# Patient Record
Sex: FEMALE | Employment: OTHER | URBAN - METROPOLITAN AREA
[De-identification: names, ages, dates, MRNs, and addresses within clinical notes are randomized per-mention and may not be internally consistent; named-entity substitution may affect disease eponyms.]

---

## 2021-07-23 ENCOUNTER — APPOINTMENT (OUTPATIENT)
Dept: CT IMAGING | Age: 86
End: 2021-07-23
Payer: COMMERCIAL

## 2021-07-23 ENCOUNTER — HOSPITAL ENCOUNTER (EMERGENCY)
Age: 86
Discharge: HOME | End: 2021-07-23
Attending: EMERGENCY MEDICINE
Payer: COMMERCIAL

## 2021-07-23 ENCOUNTER — APPOINTMENT (OUTPATIENT)
Dept: GENERAL RADIOLOGY | Age: 86
End: 2021-07-23
Payer: COMMERCIAL

## 2021-07-23 VITALS
DIASTOLIC BLOOD PRESSURE: 81 MMHG | WEIGHT: 145 LBS | TEMPERATURE: 98 F | BODY MASS INDEX: 27.38 KG/M2 | RESPIRATION RATE: 16 BRPM | OXYGEN SATURATION: 95 % | HEART RATE: 90 BPM | HEIGHT: 61 IN | SYSTOLIC BLOOD PRESSURE: 163 MMHG

## 2021-07-23 DIAGNOSIS — R45.89: Primary | ICD-10-CM

## 2021-07-23 LAB
A/G RATIO: 1.2 (ref 1.1–2.2)
ACETAMINOPHEN LEVEL: <5 UG/ML (ref 10–30)
ALBUMIN SERPL-MCNC: 4 G/DL (ref 3.4–5)
ALP BLD-CCNC: 51 U/L (ref 40–129)
ALT SERPL-CCNC: 14 U/L (ref 10–40)
AMPHETAMINE SCREEN, URINE: NORMAL
ANION GAP SERPL CALCULATED.3IONS-SCNC: 10 MMOL/L (ref 3–16)
AST SERPL-CCNC: 28 U/L (ref 15–37)
BARBITURATE SCREEN URINE: NORMAL
BASOPHILS ABSOLUTE: 0 K/UL (ref 0–0.2)
BASOPHILS RELATIVE PERCENT: 0.4 %
BENZODIAZEPINE SCREEN, URINE: NORMAL
BILIRUB SERPL-MCNC: 0.9 MG/DL (ref 0–1)
BUN BLDV-MCNC: 18 MG/DL (ref 7–20)
CALCIUM SERPL-MCNC: 9.1 MG/DL (ref 8.3–10.6)
CANNABINOID SCREEN URINE: NORMAL
CHLORIDE BLD-SCNC: 106 MMOL/L (ref 99–110)
CO2: 24 MMOL/L (ref 21–32)
COCAINE METABOLITE SCREEN URINE: NORMAL
CREAT SERPL-MCNC: 0.7 MG/DL (ref 0.6–1.2)
EKG ATRIAL RATE: 89 BPM
EKG DIAGNOSIS: NORMAL
EKG P AXIS: 77 DEGREES
EKG P-R INTERVAL: 182 MS
EKG Q-T INTERVAL: 378 MS
EKG QRS DURATION: 80 MS
EKG QTC CALCULATION (BAZETT): 459 MS
EKG R AXIS: 15 DEGREES
EKG T AXIS: -22 DEGREES
EKG VENTRICULAR RATE: 89 BPM
EOSINOPHILS ABSOLUTE: 0.1 K/UL (ref 0–0.6)
EOSINOPHILS RELATIVE PERCENT: 1.1 %
ETHANOL: NORMAL MG/DL (ref 0–0.08)
GFR AFRICAN AMERICAN: >60
GFR NON-AFRICAN AMERICAN: >60
GLOBULIN: 3.4 G/DL
GLUCOSE BLD-MCNC: 113 MG/DL (ref 70–99)
HCT VFR BLD CALC: 35.2 % (ref 36–48)
HEMOGLOBIN: 12 G/DL (ref 12–16)
LYMPHOCYTES ABSOLUTE: 0.9 K/UL (ref 1–5.1)
LYMPHOCYTES RELATIVE PERCENT: 19.6 %
Lab: NORMAL
MCH RBC QN AUTO: 30.5 PG (ref 26–34)
MCHC RBC AUTO-ENTMCNC: 33.9 G/DL (ref 31–36)
MCV RBC AUTO: 89.7 FL (ref 80–100)
METHADONE SCREEN, URINE: NORMAL
MONOCYTES ABSOLUTE: 0.5 K/UL (ref 0–1.3)
MONOCYTES RELATIVE PERCENT: 11 %
NEUTROPHILS ABSOLUTE: 3.1 K/UL (ref 1.7–7.7)
NEUTROPHILS RELATIVE PERCENT: 67.9 %
OPIATE SCREEN URINE: NORMAL
OXYCODONE URINE: NORMAL
PDW BLD-RTO: 13.1 % (ref 12.4–15.4)
PH UA: 5
PHENCYCLIDINE SCREEN URINE: NORMAL
PLATELET # BLD: 243 K/UL (ref 135–450)
PMV BLD AUTO: 7.9 FL (ref 5–10.5)
POTASSIUM SERPL-SCNC: 3.7 MMOL/L (ref 3.5–5.1)
PRO-BNP: 362 PG/ML (ref 0–449)
PROPOXYPHENE SCREEN: NORMAL
RBC # BLD: 3.92 M/UL (ref 4–5.2)
SALICYLATE, SERUM: <0.3 MG/DL (ref 15–30)
SARS-COV-2, NAAT: NOT DETECTED
SODIUM BLD-SCNC: 140 MMOL/L (ref 136–145)
TOTAL PROTEIN: 7.4 G/DL (ref 6.4–8.2)
WBC # BLD: 4.6 K/UL (ref 4–11)

## 2021-07-23 PROCEDURE — 85025 COMPLETE CBC W/AUTO DIFF WBC: CPT

## 2021-07-23 PROCEDURE — 70450 CT HEAD/BRAIN W/O DYE: CPT

## 2021-07-23 PROCEDURE — 93005 ELECTROCARDIOGRAM TRACING: CPT | Performed by: EMERGENCY MEDICINE

## 2021-07-23 PROCEDURE — 71045 X-RAY EXAM CHEST 1 VIEW: CPT

## 2021-07-23 PROCEDURE — 87635 SARS-COV-2 COVID-19 AMP PRB: CPT

## 2021-07-23 PROCEDURE — 80143 DRUG ASSAY ACETAMINOPHEN: CPT

## 2021-07-23 PROCEDURE — 82077 ASSAY SPEC XCP UR&BREATH IA: CPT

## 2021-07-23 PROCEDURE — 93010 ELECTROCARDIOGRAM REPORT: CPT | Performed by: INTERNAL MEDICINE

## 2021-07-23 PROCEDURE — 80179 DRUG ASSAY SALICYLATE: CPT

## 2021-07-23 PROCEDURE — 80053 COMPREHEN METABOLIC PANEL: CPT

## 2021-07-23 PROCEDURE — 99284 EMERGENCY DEPT VISIT MOD MDM: CPT

## 2021-07-23 PROCEDURE — 83880 ASSAY OF NATRIURETIC PEPTIDE: CPT

## 2021-07-23 PROCEDURE — 80307 DRUG TEST PRSMV CHEM ANLYZR: CPT

## 2021-07-23 ASSESSMENT — ENCOUNTER SYMPTOMS
VOMITING: 0
DIARRHEA: 0
CHEST TIGHTNESS: 0
EYE PAIN: 0
ABDOMINAL PAIN: 0
SORE THROAT: 0
SHORTNESS OF BREATH: 0

## 2021-07-23 NOTE — ED NOTES
Pt states she does not want her daughter called to be notified of pt being discharged.         Stacey RICK
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
## 2021-07-23 NOTE — ED NOTES
Level of Care Disposition: Discharge  Patient was seen by ED provider and [facility] ER staff. The case was presented to psychiatric provider on-call Dr. Nyla Stevens.   Based on the ED evaluation and information presented to the provider by Jose A Rehman RN , it is the recommendation of the on call psychiatric provider that the patient be discharged from a psychiatric standpoint with the following referrals: crisis line, resources              [facility]  07/23/21 3483
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
## 2021-07-23 NOTE — ED NOTES
Assisted to ambulate to bathroom. Gait steady. No noted distress.       Irving Jacques RN  07/23/21 6723
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
## 2024-01-29 ENCOUNTER — APPOINTMENT (OUTPATIENT)
Dept: CT IMAGING | Age: 89
DRG: 057 | End: 2024-01-29
Payer: MEDICARE

## 2024-01-29 ENCOUNTER — APPOINTMENT (OUTPATIENT)
Dept: GENERAL RADIOLOGY | Age: 89
DRG: 057 | End: 2024-01-29
Payer: MEDICARE

## 2024-01-29 ENCOUNTER — HOSPITAL ENCOUNTER (INPATIENT)
Age: 89
LOS: 15 days | Discharge: HOME | DRG: 057 | End: 2024-02-13
Attending: STUDENT IN AN ORGANIZED HEALTH CARE EDUCATION/TRAINING PROGRAM | Admitting: PSYCHIATRY & NEUROLOGY
Payer: MEDICARE

## 2024-01-29 DIAGNOSIS — F22 PARANOIA (HCC): Primary | ICD-10-CM

## 2024-01-29 PROBLEM — F03.918 DEMENTIA WITH BEHAVIORAL DISTURBANCE (HCC): Status: ACTIVE | Noted: 2024-01-29

## 2024-01-29 LAB
ALBUMIN SERPL-MCNC: 4.2 G/DL (ref 3.4–5)
ALBUMIN/GLOB SERPL: 1.8 {RATIO} (ref 1.1–2.2)
ALP SERPL-CCNC: 44 U/L (ref 40–129)
ALT SERPL-CCNC: 7 U/L (ref 10–40)
AMPHETAMINES UR QL SCN>1000 NG/ML: NORMAL
ANION GAP SERPL CALCULATED.3IONS-SCNC: 8 MMOL/L (ref 3–16)
APAP SERPL-MCNC: <5 UG/ML (ref 10–30)
AST SERPL-CCNC: 16 U/L (ref 15–37)
BARBITURATES UR QL SCN>200 NG/ML: NORMAL
BASOPHILS # BLD: 0 K/UL (ref 0–0.2)
BASOPHILS NFR BLD: 0 %
BENZODIAZ UR QL SCN>200 NG/ML: NORMAL
BILIRUB SERPL-MCNC: 0.8 MG/DL (ref 0–1)
BILIRUB UR QL STRIP.AUTO: NEGATIVE
BUN SERPL-MCNC: 21 MG/DL (ref 7–20)
CALCIUM SERPL-MCNC: 9.1 MG/DL (ref 8.3–10.6)
CANNABINOIDS UR QL SCN>50 NG/ML: NORMAL
CHLORIDE SERPL-SCNC: 105 MMOL/L (ref 99–110)
CLARITY UR: CLEAR
CO2 SERPL-SCNC: 27 MMOL/L (ref 21–32)
COCAINE UR QL SCN: NORMAL
COLOR UR: YELLOW
CREAT SERPL-MCNC: 0.7 MG/DL (ref 0.6–1.2)
DEPRECATED RDW RBC AUTO: 13.6 % (ref 12.4–15.4)
DRUG SCREEN COMMENT UR-IMP: NORMAL
EKG ATRIAL RATE: 59 BPM
EKG DIAGNOSIS: NORMAL
EKG P AXIS: 66 DEGREES
EKG P-R INTERVAL: 164 MS
EKG Q-T INTERVAL: 412 MS
EKG QRS DURATION: 78 MS
EKG QTC CALCULATION (BAZETT): 407 MS
EKG R AXIS: -5 DEGREES
EKG T AXIS: -3 DEGREES
EKG VENTRICULAR RATE: 59 BPM
EOSINOPHIL # BLD: 0 K/UL (ref 0–0.6)
EOSINOPHIL NFR BLD: 0 %
ETHANOLAMINE SERPL-MCNC: NORMAL MG/DL (ref 0–0.08)
FENTANYL SCREEN, URINE: NORMAL
FLUAV RNA RESP QL NAA+PROBE: NOT DETECTED
FLUBV RNA RESP QL NAA+PROBE: NOT DETECTED
GFR SERPLBLD CREATININE-BSD FMLA CKD-EPI: >60 ML/MIN/{1.73_M2}
GLUCOSE SERPL-MCNC: 139 MG/DL (ref 70–99)
HCT VFR BLD AUTO: 35 % (ref 36–48)
HGB BLD-MCNC: 11.9 G/DL (ref 12–16)
HGB UR QL STRIP.AUTO: NEGATIVE
KETONES UR STRIP.AUTO-MCNC: NEGATIVE MG/DL
LEUKOCYTE ESTERASE UR QL STRIP.AUTO: NEGATIVE
LYMPHOCYTES # BLD: 1.3 K/UL (ref 1–5.1)
LYMPHOCYTES NFR BLD: 23 %
MCH RBC QN AUTO: 31 PG (ref 26–34)
MCHC RBC AUTO-ENTMCNC: 34.1 G/DL (ref 31–36)
MCV RBC AUTO: 90.9 FL (ref 80–100)
METHADONE UR QL SCN>300 NG/ML: NORMAL
MONOCYTES # BLD: 0.6 K/UL (ref 0–1.3)
MONOCYTES NFR BLD: 11 %
NEUTROPHILS # BLD: 3.8 K/UL (ref 1.7–7.7)
NEUTROPHILS NFR BLD: 66 %
NITRITE UR QL STRIP.AUTO: NEGATIVE
OPIATES UR QL SCN>300 NG/ML: NORMAL
OXYCODONE UR QL SCN: NORMAL
PCP UR QL SCN>25 NG/ML: NORMAL
PH UR STRIP.AUTO: 6 [PH] (ref 5–8)
PH UR STRIP: 6 [PH]
PLATELET # BLD AUTO: 212 K/UL (ref 135–450)
PLATELET BLD QL SMEAR: ADEQUATE
PMV BLD AUTO: 8.9 FL (ref 5–10.5)
POTASSIUM SERPL-SCNC: 3.8 MMOL/L (ref 3.5–5.1)
PROT SERPL-MCNC: 6.5 G/DL (ref 6.4–8.2)
PROT UR STRIP.AUTO-MCNC: NEGATIVE MG/DL
RBC # BLD AUTO: 3.85 M/UL (ref 4–5.2)
RBC MORPH BLD: NORMAL
SALICYLATES SERPL-MCNC: <0.3 MG/DL (ref 15–30)
SARS-COV-2 RNA RESP QL NAA+PROBE: NOT DETECTED
SLIDE REVIEW: ABNORMAL
SODIUM SERPL-SCNC: 140 MMOL/L (ref 136–145)
SP GR UR STRIP.AUTO: 1.01 (ref 1–1.03)
TROPONIN, HIGH SENSITIVITY: 20 NG/L (ref 0–14)
TROPONIN, HIGH SENSITIVITY: 23 NG/L (ref 0–14)
TSH SERPL DL<=0.005 MIU/L-ACNC: 1.72 UIU/ML (ref 0.27–4.2)
UA DIPSTICK W REFLEX MICRO PNL UR: NORMAL
URN SPEC COLLECT METH UR: NORMAL
UROBILINOGEN UR STRIP-ACNC: 0.2 E.U./DL
WBC # BLD AUTO: 5.7 K/UL (ref 4–11)

## 2024-01-29 PROCEDURE — 82077 ASSAY SPEC XCP UR&BREATH IA: CPT

## 2024-01-29 PROCEDURE — 84484 ASSAY OF TROPONIN QUANT: CPT

## 2024-01-29 PROCEDURE — 81003 URINALYSIS AUTO W/O SCOPE: CPT

## 2024-01-29 PROCEDURE — 80053 COMPREHEN METABOLIC PANEL: CPT

## 2024-01-29 PROCEDURE — 87636 SARSCOV2 & INF A&B AMP PRB: CPT

## 2024-01-29 PROCEDURE — 80179 DRUG ASSAY SALICYLATE: CPT

## 2024-01-29 PROCEDURE — 80061 LIPID PANEL: CPT

## 2024-01-29 PROCEDURE — 93010 ELECTROCARDIOGRAM REPORT: CPT | Performed by: INTERNAL MEDICINE

## 2024-01-29 PROCEDURE — 83036 HEMOGLOBIN GLYCOSYLATED A1C: CPT

## 2024-01-29 PROCEDURE — 93005 ELECTROCARDIOGRAM TRACING: CPT | Performed by: STUDENT IN AN ORGANIZED HEALTH CARE EDUCATION/TRAINING PROGRAM

## 2024-01-29 PROCEDURE — 84443 ASSAY THYROID STIM HORMONE: CPT

## 2024-01-29 PROCEDURE — 80307 DRUG TEST PRSMV CHEM ANLYZR: CPT

## 2024-01-29 PROCEDURE — 36415 COLL VENOUS BLD VENIPUNCTURE: CPT

## 2024-01-29 PROCEDURE — 80143 DRUG ASSAY ACETAMINOPHEN: CPT

## 2024-01-29 PROCEDURE — 85025 COMPLETE CBC W/AUTO DIFF WBC: CPT

## 2024-01-29 PROCEDURE — 70450 CT HEAD/BRAIN W/O DYE: CPT

## 2024-01-29 PROCEDURE — 99285 EMERGENCY DEPT VISIT HI MDM: CPT

## 2024-01-29 PROCEDURE — 71045 X-RAY EXAM CHEST 1 VIEW: CPT

## 2024-01-29 PROCEDURE — 1240000000 HC EMOTIONAL WELLNESS R&B

## 2024-01-29 PROCEDURE — 93005 ELECTROCARDIOGRAM TRACING: CPT | Performed by: PSYCHIATRY & NEUROLOGY

## 2024-01-29 RX ORDER — IBUPROFEN 400 MG/1
400 TABLET ORAL EVERY 6 HOURS PRN
Status: DISCONTINUED | OUTPATIENT
Start: 2024-01-29 | End: 2024-01-30

## 2024-01-29 RX ORDER — OLANZAPINE 5 MG/1
2.5 TABLET ORAL EVERY 8 HOURS PRN
Status: DISCONTINUED | OUTPATIENT
Start: 2024-01-29 | End: 2024-02-13 | Stop reason: HOSPADM

## 2024-01-29 RX ORDER — HYDROXYZINE HYDROCHLORIDE 10 MG/1
10 TABLET, FILM COATED ORAL EVERY 6 HOURS PRN
Status: DISCONTINUED | OUTPATIENT
Start: 2024-01-29 | End: 2024-02-13 | Stop reason: HOSPADM

## 2024-01-29 RX ORDER — MECOBALAMIN 5000 MCG
5 TABLET,DISINTEGRATING ORAL NIGHTLY PRN
Status: DISCONTINUED | OUTPATIENT
Start: 2024-01-29 | End: 2024-01-30

## 2024-01-29 RX ORDER — ACETAMINOPHEN 325 MG/1
650 TABLET ORAL EVERY 4 HOURS PRN
Status: DISCONTINUED | OUTPATIENT
Start: 2024-01-29 | End: 2024-01-30

## 2024-01-29 RX ORDER — BENZTROPINE MESYLATE 1 MG/ML
2 INJECTION INTRAMUSCULAR; INTRAVENOUS 2 TIMES DAILY PRN
Status: DISCONTINUED | OUTPATIENT
Start: 2024-01-29 | End: 2024-01-30

## 2024-01-29 RX ORDER — POLYETHYLENE GLYCOL 3350 17 G
2 POWDER IN PACKET (EA) ORAL
Status: DISCONTINUED | OUTPATIENT
Start: 2024-01-29 | End: 2024-02-13 | Stop reason: HOSPADM

## 2024-01-29 RX ORDER — MECOBALAMIN 5000 MCG
5 TABLET,DISINTEGRATING ORAL NIGHTLY
Status: DISCONTINUED | OUTPATIENT
Start: 2024-01-29 | End: 2024-01-29

## 2024-01-29 RX ORDER — MAGNESIUM HYDROXIDE/ALUMINUM HYDROXICE/SIMETHICONE 120; 1200; 1200 MG/30ML; MG/30ML; MG/30ML
30 SUSPENSION ORAL EVERY 6 HOURS PRN
Status: DISCONTINUED | OUTPATIENT
Start: 2024-01-29 | End: 2024-02-13 | Stop reason: HOSPADM

## 2024-01-29 RX ORDER — NICOTINE 21 MG/24HR
1 PATCH, TRANSDERMAL 24 HOURS TRANSDERMAL DAILY
Status: DISCONTINUED | OUTPATIENT
Start: 2024-01-29 | End: 2024-01-30
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
## 2024-01-29 NOTE — ED NOTES
Level of Care Disposition: admit     Patient was seen by ED provider and Nursing/SW staff.  The case presented to psychiatric provider on-call .  Based on the ED evaluation and information presented to the provider by this writer it is the recommendation of the on call psychiatric provider that inpatient hospitalization is the least restrictive environment for the patient at this time.  The patient will be admitted to the inpatient unit.               Insurance Pre certification Authorization: IZABELLA Mcmillan MSW,LSW
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
## 2024-01-30 PROBLEM — R03.0 ELEVATED BP WITHOUT DIAGNOSIS OF HYPERTENSION: Status: ACTIVE | Noted: 2024-01-30

## 2024-01-30 PROBLEM — F22 PARANOIA (HCC): Status: ACTIVE | Noted: 2024-01-30

## 2024-01-30 PROBLEM — R79.89 ELEVATED TROPONIN: Status: ACTIVE | Noted: 2024-01-30

## 2024-01-30 LAB
CHOLEST SERPL-MCNC: 247 MG/DL (ref 0–199)
EKG ATRIAL RATE: 68 BPM
EKG DIAGNOSIS: NORMAL
EKG P AXIS: 48 DEGREES
EKG P-R INTERVAL: 158 MS
EKG Q-T INTERVAL: 404 MS
EKG QRS DURATION: 78 MS
EKG QTC CALCULATION (BAZETT): 429 MS
EKG R AXIS: 24 DEGREES
EKG T AXIS: 17 DEGREES
EKG VENTRICULAR RATE: 68 BPM
HDLC SERPL-MCNC: 80 MG/DL (ref 40–60)
LDLC SERPL CALC-MCNC: 151 MG/DL
TRIGL SERPL-MCNC: 78 MG/DL (ref 0–150)
VLDLC SERPL CALC-MCNC: 16 MG/DL

## 2024-01-30 PROCEDURE — 6370000000 HC RX 637 (ALT 250 FOR IP): Performed by: PSYCHIATRY & NEUROLOGY

## 2024-01-30 PROCEDURE — 99221 1ST HOSP IP/OBS SF/LOW 40: CPT | Performed by: NURSE PRACTITIONER

## 2024-01-30 PROCEDURE — 1240000000 HC EMOTIONAL WELLNESS R&B

## 2024-01-30 PROCEDURE — 93010 ELECTROCARDIOGRAM REPORT: CPT | Performed by: INTERNAL MEDICINE

## 2024-01-30 RX ORDER — MECOBALAMIN 5000 MCG
5 TABLET,DISINTEGRATING ORAL EVERY EVENING
Status: DISCONTINUED | OUTPATIENT
Start: 2024-01-30 | End: 2024-02-13 | Stop reason: HOSPADM

## 2024-01-30 RX ORDER — ACETAMINOPHEN 325 MG/1
650 TABLET ORAL EVERY 8 HOURS PRN
Status: DISCONTINUED | OUTPATIENT
Start: 2024-01-30 | End: 2024-02-13 | Stop reason: HOSPADM

## 2024-01-30 RX ORDER — DONEPEZIL HYDROCHLORIDE 5 MG/1
5 TABLET, FILM COATED ORAL DAILY
Status: DISCONTINUED | OUTPATIENT
Start: 2024-01-31 | End: 2024-02-01

## 2024-01-30 RX ADMIN — Medication 5 MG: at 18:19

## 2024-01-30 NOTE — PLAN OF CARE
Problem: Safety - Adult  Goal: Free from fall injury  1/30/2024 1215 by Kacie Garcia RN  Outcome: Progressing     Problem: Behavior  Goal: Pt/Family maintain appropriate behavior and adhere to behavioral management agreement, if implemented  Description: INTERVENTIONS:  1. Assess patient/family's coping skills and  non-compliant behavior (including use of illegal substances)  2. Notify security of behavior or suspected illegal substances which indicate the need for search of the family and/or belongings  3. Encourage verbalization of thoughts and concerns in a socially appropriate manner  4. Utilize positive, consistent limit setting strategies supporting safety of patient, staff and others  5. Encourage participation in the decision making process about the behavioral management agreement  6. If a visitor's behavior poses a threat to safety call refer to organization policy.  7. Initiate consult with , Psychosocial CNS, Spiritual Care as appropriate  Outcome: Progressing    Pt has been visible on the unit. She is pleasantly confused. She has no scheduled medications. Pt does not smoke at home- nicotine patch not given. Pt has eaten breakfast and lunch. She has attended groups and is currently watching tv. Denies needs
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
## 2024-01-30 NOTE — GROUP NOTE
Group Therapy Note    Date: 1/30/2024    Group Start Time: 1000  Group End Time: 1045  Group Topic: Cognitive Skills    [facility]    Ziyad Franks        Group Therapy Note    Topic: Sensory Reminiscence    Group members discussed reminiscence and legacy-building in theory. Facilitator led group discussion of 5 senses, reminisced on memories from childhood, teen years, young adulthood within 5 senses.    Attendees: 4       Notes:  Sonia present and actively engaged during discussions. Reminisced on sensory memories associated with childhood including meals, preferred clothes, leisure activities, first car, first job. Met goal for group.    Status After Intervention:  Improved    Participation Level: Active Listener and Interactive    Participation Quality: Appropriate, Attentive, and Sharing      Speech:  normal      Thought Process/Content: Logical      Affective Functioning: Congruent      Mood: euthymic      Level of consciousness:  Alert and Oriented x4      Response to Learning: Capable of insight and Progressing to goal      Endings: None Reported    Modes of Intervention: Education, Support, Socialization, Exploration, Clarifying, and Problem-solving      Discipline Responsible: Psychoeducational Specialist      Signature:  Ziyad Franks, PREETI, MT-BC

## 2024-01-30 NOTE — PLAN OF CARE
[facility]  Initial Interdisciplinary Treatment Plan NOTE    Review Date & Time: 1/30/24 0900    Patient was not in treatment team    Admission Type:   Admission Type: Voluntary    Reason for admission:  Reason for Admission: Combative behavior at home, psychosis with delusional thoughts.      Estimated Length of Stay Update:  3-5 days  Estimated Discharge Date Update: 2/2/24-2/4/24    EDUCATION:   Learner Progress Toward Treatment Goals: Reviewed results and recommendations of this team    Method: Individual    Outcome: Verbalized understanding    PATIENT GOALS: none expressed    PLAN/TREATMENT RECOMMENDATIONS UPDATE:evaluate and treat    GOALS UPDATE:   Time frame for Short-Term Goals: 2 days    Abhijeet Noriega RN

## 2024-01-30 NOTE — CARE COORDINATION
Psychosocial, PTSD screen, and CSSR - S completed via chart review and collateral by VIOLET Samuels due to AMS.    Collateral obtained from daughter, Juan A.    In late 70s, psychologist diagnosed patient with a mental health disorder (bipolar or schizophrenia, cannot recall which) but she denied treatment. Has refused to go to doctors since that incident. Patient hasn't seen a doctor in over 40 years. No former neurocognitive testing or diagnosis of dementia.    Daughter suspects mom has dementia. When asked about symptoms, Juan A shared how patient displays severe memory loss and confusion. Patient frequently loses belongings, such as purse, keys, hearing aids, ect. At one point, she was climbing in and out of back window to get into house because she lost all keys and garage opener.   Patient forgets to eat on a regular basis and has stopped taking care of personal hygiene. Juan A reports mom sometimes wears the same clothes for a week straight and resists encouragement to shower and change.     Sometimes, patient thinks people who passed away several years ago are still alive and she forgets to pay her bills. Recently, Juan A found an unpaid electric bill of $1000. Juan A also reports that patient used to be extremely tech savvy (ordering groceries online, using social media, and playing games) but can no longer recall how to use a cell phone or computer.      Additionally, patient has started wandering out of the house and has increased paranoia. Has been stuffing knifes in between sofa cushions or walking dog with a knife in hand. She doesn't let people in her house due to mistrust of others and is resistant to people offering support or care.    Daughter is willing to get guardianship. This writer recommended speaking with an  regarding the guardianship process and informed patient on where to obtain guardianship paper work. [facility]
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
## 2024-01-31 LAB
EST. AVERAGE GLUCOSE BLD GHB EST-MCNC: 99.7 MG/DL
HBA1C MFR BLD: 5.1 %

## 2024-01-31 PROCEDURE — 1240000000 HC EMOTIONAL WELLNESS R&B

## 2024-01-31 PROCEDURE — 82746 ASSAY OF FOLIC ACID SERUM: CPT

## 2024-01-31 PROCEDURE — 6370000000 HC RX 637 (ALT 250 FOR IP): Performed by: PSYCHIATRY & NEUROLOGY

## 2024-01-31 PROCEDURE — 82607 VITAMIN B-12: CPT

## 2024-01-31 PROCEDURE — 99233 SBSQ HOSP IP/OBS HIGH 50: CPT | Performed by: PSYCHIATRY & NEUROLOGY

## 2024-01-31 PROCEDURE — 36415 COLL VENOUS BLD VENIPUNCTURE: CPT

## 2024-01-31 RX ADMIN — DONEPEZIL HYDROCHLORIDE 5 MG: 5 TABLET, FILM COATED ORAL at 12:35

## 2024-01-31 NOTE — GROUP NOTE
Group Therapy Note    Date: 1/31/2024    Group Start Time: 1100  Group End Time: 1145  Group Topic: Psychoeducation    [facility] MSW        Group Therapy Note    Attendees: 4    Patients discussed the values and strengths that they possess and explored their own identities. Patients were given a blank Coat of Arms and were prompted to think of words, symbols, and colors that best represent who they are, their likes, passions, talents, and skills. Patients were also asked to reflect on any important people in their lives and include images or words to represent them.        Notes:  Patient was engaged in discussion and identified colors and words that would best describe her.    Status After Intervention:  Improved    Participation Level: Active Listener    Participation Quality: Appropriate, Attentive, and Sharing      Speech:  normal      Thought Process/Content: Logical      Affective Functioning: Congruent      Mood: euthymic      Level of consciousness:  Alert and Attentive      Response to Learning: Able to verbalize current knowledge/experience, Capable of insight, Able to change behavior, and Progressing to goal      Endings: None Reported    Modes of Intervention: Education and Activity      Discipline Responsible: /Counselor      Signature:  MERLINE Samuels

## 2024-01-31 NOTE — PLAN OF CARE
Problem: Safety - Adult  Goal: Free from fall injury  Outcome: Progressing     Problem: ABCDS Injury Assessment  Goal: Absence of physical injury  Outcome: Progressing     Problem: Behavior  Goal: Pt/Family maintain appropriate behavior and adhere to behavioral management agreement, if implemented  Description: INTERVENTIONS:  1. Assess patient/family's coping skills and  non-compliant behavior (including use of illegal substances)  2. Notify security of behavior or suspected illegal substances which indicate the need for search of the family and/or belongings  3. Encourage verbalization of thoughts and concerns in a socially appropriate manner  4. Utilize positive, consistent limit setting strategies supporting safety of patient, staff and others  5. Encourage participation in the decision making process about the behavioral management agreement  6. If a visitor's behavior poses a threat to safety call refer to organization policy.  7. Initiate consult with , Psychosocial CNS, Spiritual Care as appropriate  Outcome: Progressing     Problem: Self Care Deficit  Goal: Return ADL status to a safe level of function  Description: INTERVENTIONS:  1. Administer medication as ordered  2. Assess ADL deficits and provide assistive devices as needed  3. Obtain PT/OT consults as needed  4. Assist and instruct patient to increase activity and self care as tolerated  Outcome: Progressing

## 2024-01-31 NOTE — GROUP NOTE
Group Therapy Note    Date: 1/31/2024    Group Start Time: 1000  Group End Time: 1045  Group Topic: Cognitive Skills    [facility]    Ziyad Franks        Group Therapy Note    Group members engaged in semi-structured socialization group, utilizing \"Positive Self-Talk\" conversation ball. Group members selected a question + 1 peer in group to share the question with.    Attendees: 4       Notes:  Pt present and actively engaged throughout duration of structured group stimuli. Functioned as ring-leader during peer engagement, encouraging active participation throughout. Met goal for group.    Status After Intervention:  Improved    Participation Level: Active Listener and Interactive    Participation Quality: Appropriate and Attentive      Speech:  normal      Thought Process/Content: Logical      Affective Functioning: Congruent      Mood: euthymic      Level of consciousness:  Alert and Oriented x4      Response to Learning: Able to verbalize current knowledge/experience, Able to verbalize/acknowledge new learning, and Progressing to goal      Endings: None Reported    Modes of Intervention: Support, Socialization, Activity, Media, and Reality-testing      Discipline Responsible: Psychoeducational Specialist      Signature:  Ziyad Franks, PREETI, MT-BC

## 2024-02-01 LAB
FOLATE SERPL-MCNC: 5.74 NG/ML (ref 4.78–24.2)
VIT B12 SERPL-MCNC: 182 PG/ML (ref 211–911)

## 2024-02-01 PROCEDURE — 6370000000 HC RX 637 (ALT 250 FOR IP): Performed by: PSYCHIATRY & NEUROLOGY

## 2024-02-01 PROCEDURE — 1240000000 HC EMOTIONAL WELLNESS R&B

## 2024-02-01 PROCEDURE — 99233 SBSQ HOSP IP/OBS HIGH 50: CPT | Performed by: PSYCHIATRY & NEUROLOGY

## 2024-02-01 RX ORDER — DONEPEZIL HYDROCHLORIDE 5 MG/1
10 TABLET, FILM COATED ORAL DAILY
Status: DISCONTINUED | OUTPATIENT
Start: 2024-02-01 | End: 2024-02-13 | Stop reason: HOSPADM

## 2024-02-01 RX ADMIN — Medication 5 MG: at 21:02

## 2024-02-01 RX ADMIN — DONEPEZIL HYDROCHLORIDE 10 MG: 5 TABLET, FILM COATED ORAL at 10:24

## 2024-02-01 NOTE — PLAN OF CARE
Problem: Safety - Adult  Goal: Free from fall injury  Outcome: Not Progressing     Problem: ABCDS Injury Assessment  Goal: Absence of physical injury  Outcome: Progressing     Problem: Behavior  Goal: Pt/Family maintain appropriate behavior and adhere to behavioral management agreement, if implemented  Description: INTERVENTIONS:  1. Assess patient/family's coping skills and  non-compliant behavior (including use of illegal substances)  2. Notify security of behavior or suspected illegal substances which indicate the need for search of the family and/or belongings  3. Encourage verbalization of thoughts and concerns in a socially appropriate manner  4. Utilize positive, consistent limit setting strategies supporting safety of patient, staff and others  5. Encourage participation in the decision making process about the behavioral management agreement  6. If a visitor's behavior poses a threat to safety call refer to organization policy.  7. Initiate consult with , Psychosocial CNS, Spiritual Care as appropriate  Outcome: Progressing     Problem: Safety - Adult  Goal: Free from fall injury  Outcome: Not Progressing     Problem: Self Care Deficit  Goal: Return ADL status to a safe level of function  Description: INTERVENTIONS:  1. Administer medication as ordered  2. Assess ADL deficits and provide assistive devices as needed  3. Obtain PT/OT consults as needed  4. Assist and instruct patient to increase activity and self care as tolerated  Outcome: Not Progressing

## 2024-02-01 NOTE — PLAN OF CARE
+meds. Isolated to room until after dinner. Friendly and cooperative.  Ambulates self.  Neighbor called and patient allowed me to give her information.  She said she will try to visit tomorrow.       Problem: Safety - Adult  Goal: Free from fall injury  Outcome: Progressing     Problem: ABCDS Injury Assessment  Goal: Absence of physical injury  Outcome: Progressing     Problem: Behavior  Goal: Pt/Family maintain appropriate behavior and adhere to behavioral management agreement, if implemented  Description: INTERVENTIONS:  1. Assess patient/family's coping skills and  non-compliant behavior (including use of illegal substances)  2. Notify security of behavior or suspected illegal substances which indicate the need for search of the family and/or belongings  3. Encourage verbalization of thoughts and concerns in a socially appropriate manner  4. Utilize positive, consistent limit setting strategies supporting safety of patient, staff and others  5. Encourage participation in the decision making process about the behavioral management agreement  6. If a visitor's behavior poses a threat to safety call refer to organization policy.  7. Initiate consult with , Psychosocial CNS, Spiritual Care as appropriate  Outcome: Progressing     Problem: Self Care Deficit  Goal: Return ADL status to a safe level of function  Description: INTERVENTIONS:  1. Administer medication as ordered  2. Assess ADL deficits and provide assistive devices as needed  3. Obtain PT/OT consults as needed  4. Assist and instruct patient to increase activity and self care as tolerated  Outcome: Progressing

## 2024-02-02 PROBLEM — E53.8 LOW SERUM VITAMIN B12: Status: ACTIVE | Noted: 2024-02-02

## 2024-02-02 PROCEDURE — 6370000000 HC RX 637 (ALT 250 FOR IP): Performed by: PSYCHIATRY & NEUROLOGY

## 2024-02-02 PROCEDURE — 99233 SBSQ HOSP IP/OBS HIGH 50: CPT | Performed by: PSYCHIATRY & NEUROLOGY

## 2024-02-02 PROCEDURE — 1240000000 HC EMOTIONAL WELLNESS R&B

## 2024-02-02 RX ORDER — CHOLECALCIFEROL (VITAMIN D3) 125 MCG
1000 CAPSULE ORAL DAILY
Status: DISCONTINUED | OUTPATIENT
Start: 2024-02-02 | End: 2024-02-13 | Stop reason: HOSPADM

## 2024-02-02 RX ADMIN — Medication 1000 MCG: at 15:16

## 2024-02-02 RX ADMIN — DONEPEZIL HYDROCHLORIDE 10 MG: 5 TABLET, FILM COATED ORAL at 09:41

## 2024-02-02 RX ADMIN — Medication 5 MG: at 18:24

## 2024-02-02 NOTE — GROUP NOTE
Group Therapy Note    Date: 2/2/2024    Group Start Time: 1300  Group End Time: 1400  Group Topic: Recreational    [facility]    Karime Anderson, RT        Group Therapy Note    Attendees: 5    Facilitator ran recreational therapy session using Bingo as group intervention.  Game allowed participants to exercise cognitive and social abilities.      Notes:  Pt was present and engaged across session.  Was able to look at the numbers called when she was unable to hear.  Able to find all numbers and was successful in getting the first \"cover all\" card.  Fully participated in game and demonstrated enjoyment with peers through banter, smiles, and laughter.  Met goal for group.    Status After Intervention:  Improved    Participation Level: Active Listener and Interactive    Participation Quality: Appropriate, Attentive, Sharing, and Supportive      Speech:  soft      Thought Process/Content: Logical      Affective Functioning: Congruent      Mood: euthymic      Level of consciousness:  Alert and Attentive      Response to Learning: Able to verbalize current knowledge/experience, Able to verbalize/acknowledge new learning, Able to retain information, Capable of insight, and Progressing to goal      Endings: None Reported    Modes of Intervention: Support, Socialization, and Activity      Discipline Responsible: Psychoeducational Specialist and Recreational Therapist      Signature:  Karime Anderson MA, CTRS

## 2024-02-02 NOTE — GROUP NOTE
Group Therapy Note    Date: 2/2/2024    Group Start Time: 1000  Group End Time: 1045  Group Topic: Group Therapy    [facility]    Karime Anderson, RT        Group Therapy Note    Attendees: 4    Therapist facilitated group therapy session in the form of a coffee social.  Participants were invited to the group room following community meeting to join into leisure group.  Group members were oriented to the various leisure options while on the unit and were prompted to share about their personal leisure interests during group discussion.      Notes:  Pt was present and attentive across session.  Communicated to facilitator about having trouble hearing.  Pleasant interactions with peers and positive demeanor.    Status After Intervention:  Unchanged    Participation Level: Interactive    Participation Quality: Attentive and Supportive      Speech:  normal      Thought Process/Content: Logical      Affective Functioning: Congruent      Mood: euthymic      Level of consciousness:  Alert and Attentive      Response to Learning: Able to verbalize current knowledge/experience, Capable of insight, and Progressing to goal      Endings: None Reported    Modes of Intervention: Education, Support, Socialization, and Media      Discipline Responsible: Psychoeducational Specialist and Recreational Therapist      Signature:  Karime Anderson MA, CTRS

## 2024-02-02 NOTE — PLAN OF CARE
A+Ox1.  +meds. Sang in morning group. Hard of hearing in one ear.  Visible on unit. Friendly with peers.  Appears confused, but I think she isn't interacting because of the hearing loss.  Appetite good. Did not participate in afternoon group.  Daughter called for update and said she was surprised she took her meds.     Problem: Safety - Adult  Goal: Free from fall injury  Outcome: Progressing     Problem: ABCDS Injury Assessment  Goal: Absence of physical injury  Outcome: Progressing     Problem: Behavior  Goal: Pt/Family maintain appropriate behavior and adhere to behavioral management agreement, if implemented  Description: INTERVENTIONS:  1. Assess patient/family's coping skills and  non-compliant behavior (including use of illegal substances)  2. Notify security of behavior or suspected illegal substances which indicate the need for search of the family and/or belongings  3. Encourage verbalization of thoughts and concerns in a socially appropriate manner  4. Utilize positive, consistent limit setting strategies supporting safety of patient, staff and others  5. Encourage participation in the decision making process about the behavioral management agreement  6. If a visitor's behavior poses a threat to safety call refer to organization policy.  7. Initiate consult with , Psychosocial CNS, Spiritual Care as appropriate  Outcome: Progressing

## 2024-02-03 PROCEDURE — 6370000000 HC RX 637 (ALT 250 FOR IP): Performed by: PSYCHIATRY & NEUROLOGY

## 2024-02-03 PROCEDURE — 1240000000 HC EMOTIONAL WELLNESS R&B

## 2024-02-03 PROCEDURE — 99233 SBSQ HOSP IP/OBS HIGH 50: CPT | Performed by: PSYCHIATRY & NEUROLOGY

## 2024-02-03 RX ORDER — MEMANTINE HYDROCHLORIDE 5 MG/1
5 TABLET ORAL 2 TIMES DAILY
Status: DISCONTINUED | OUTPATIENT
Start: 2024-02-03 | End: 2024-02-13 | Stop reason: HOSPADM

## 2024-02-03 RX ADMIN — Medication 1000 MCG: at 08:58

## 2024-02-03 RX ADMIN — DONEPEZIL HYDROCHLORIDE 10 MG: 5 TABLET, FILM COATED ORAL at 08:58

## 2024-02-03 RX ADMIN — Medication 5 MG: at 19:44

## 2024-02-03 RX ADMIN — MEMANTINE HYDROCHLORIDE 5 MG: 5 TABLET ORAL at 20:43

## 2024-02-03 NOTE — PLAN OF CARE
Problem: Safety - Adult  Goal: Free from fall injury  2/2/2024 2125 by Mikayla Hugo LPN  Outcome: Progressing     Problem: ABCDS Injury Assessment  Goal: Absence of physical injury  2/2/2024 2125 by Mikayla Hugo LPN  Outcome: Progressing     Problem: Behavior  Goal: Pt/Family maintain appropriate behavior and adhere to behavioral management agreement, if implemented  Description: INTERVENTIONS:  1. Assess patient/family's coping skills and  non-compliant behavior (including use of illegal substances)  2. Notify security of behavior or suspected illegal substances which indicate the need for search of the family and/or belongings  3. Encourage verbalization of thoughts and concerns in a socially appropriate manner  4. Utilize positive, consistent limit setting strategies supporting safety of patient, staff and others  5. Encourage participation in the decision making process about the behavioral management agreement  6. If a visitor's behavior poses a threat to safety call refer to organization policy.  7. Initiate consult with , Psychosocial CNS, Spiritual Care as appropriate  2/2/2024 2125 by Mikayla Hugo LPN  Outcome: Progressing     Problem: Self Care Deficit  Goal: Return ADL status to a safe level of function  Description: INTERVENTIONS:  1. Administer medication as ordered  2. Assess ADL deficits and provide assistive devices as needed  3. Obtain PT/OT consults as needed  4. Assist and instruct patient to increase activity and self care as tolerated  Outcome: Progressing

## 2024-02-03 NOTE — PLAN OF CARE
Problem: Safety - Adult  Goal: Free from fall injury  2/3/2024 0927 by Kacie Garcia, RN  Outcome: Progressing     Problem: Behavior  Goal: Pt/Family maintain appropriate behavior and adhere to behavioral management agreement, if implemented  Description: INTERVENTIONS:  1. Assess patient/family's coping skills and  non-compliant behavior (including use of illegal substances)  2. Notify security of behavior or suspected illegal substances which indicate the need for search of the family and/or belongings  3. Encourage verbalization of thoughts and concerns in a socially appropriate manner  4. Utilize positive, consistent limit setting strategies supporting safety of patient, staff and others  5. Encourage participation in the decision making process about the behavioral management agreement  6. If a visitor's behavior poses a threat to safety call refer to organization policy.  7. Initiate consult with , Psychosocial CNS, Spiritual Care as appropriate  2/3/2024 0927 by Kacie Garcia, RN  Outcome: Progressing    Pt has been visible on the unit. She is oriented to self only. She has been medication compliant. She ate breakfast in the day room and has sat amongst peers. Pt is [place] but did respond well when staff wrote stuff down for her to read. She denies SI/HI. Denies needs currently

## 2024-02-04 PROCEDURE — 6370000000 HC RX 637 (ALT 250 FOR IP): Performed by: PSYCHIATRY & NEUROLOGY

## 2024-02-04 PROCEDURE — 1240000000 HC EMOTIONAL WELLNESS R&B

## 2024-02-04 RX ADMIN — MEMANTINE HYDROCHLORIDE 5 MG: 5 TABLET ORAL at 08:39

## 2024-02-04 RX ADMIN — MEMANTINE HYDROCHLORIDE 5 MG: 5 TABLET ORAL at 21:13

## 2024-02-04 RX ADMIN — Medication 5 MG: at 18:27

## 2024-02-04 RX ADMIN — Medication 1000 MCG: at 08:39

## 2024-02-04 RX ADMIN — DONEPEZIL HYDROCHLORIDE 10 MG: 5 TABLET, FILM COATED ORAL at 08:39

## 2024-02-04 NOTE — PLAN OF CARE
Problem: Safety - Adult  Goal: Free from fall injury  2/4/2024 0923 by Kacie Garcia, RN  Outcome: Progressing     Problem: Behavior  Goal: Pt/Family maintain appropriate behavior and adhere to behavioral management agreement, if implemented  Description: INTERVENTIONS:  1. Assess patient/family's coping skills and  non-compliant behavior (including use of illegal substances)  2. Notify security of behavior or suspected illegal substances which indicate the need for search of the family and/or belongings  3. Encourage verbalization of thoughts and concerns in a socially appropriate manner  4. Utilize positive, consistent limit setting strategies supporting safety of patient, staff and others  5. Encourage participation in the decision making process about the behavioral management agreement  6. If a visitor's behavior poses a threat to safety call refer to organization policy.  7. Initiate consult with , Psychosocial CNS, Spiritual Care as appropriate  2/4/2024 0923 by Kacie Garcia, RN  Outcome: Progressing    Pt has been visible on the unit. She has been cooperative with staff. She has been medication compliant. She denies SI/HI/AVH and no RTIS noted. She ate breakfast in the day room. Pt is very [place] and responds well when phrases are written down. Denies needs currently

## 2024-02-04 NOTE — PLAN OF CARE
2/4/24 @ 0102  Pt has been calm and cooperative this shift. She takes her 2100  meds whole and without issue. Pt remains pleasantly confused. She is alert to person and place only. When this nurse would administer meds, pt would ask this nurse the reason for each one. This nurse educated pt on each med she would take and pt would become frustrated when the word \"dementia\" was mentioned. Pt denies having any memory or confusion issues. Pt would repeat the same question only minutes apart and deny having already asked that same question. Pt has limited insight into her dx. Pt denies current SI/HI and also denies AVH. Not RTIS. Pt visible in the dining room and was interacting with peers appropriately. This nurse will continue to monitor.       Problem: Safety - Adult  Goal: Free from fall injury  Outcome: Progressing     Problem: ABCDS Injury Assessment  Goal: Absence of physical injury  Outcome: Progressing     Problem: Behavior  Goal: Pt/Family maintain appropriate behavior and adhere to behavioral management agreement, if implemented  Description: INTERVENTIONS:  1. Assess patient/family's coping skills and  non-compliant behavior (including use of illegal substances)  2. Notify security of behavior or suspected illegal substances which indicate the need for search of the family and/or belongings  3. Encourage verbalization of thoughts and concerns in a socially appropriate manner  4. Utilize positive, consistent limit setting strategies supporting safety of patient, staff and others  5. Encourage participation in the decision making process about the behavioral management agreement  6. If a visitor's behavior poses a threat to safety call refer to organization policy.  7. Initiate consult with , Psychosocial CNS, Spiritual Care as appropriate  Outcome: Progressing     Problem: Self Care Deficit  Goal: Return ADL status to a safe level of function  Description: INTERVENTIONS:  1. Administer medication

## 2024-02-05 PROCEDURE — 6370000000 HC RX 637 (ALT 250 FOR IP): Performed by: PSYCHIATRY & NEUROLOGY

## 2024-02-05 PROCEDURE — 99233 SBSQ HOSP IP/OBS HIGH 50: CPT | Performed by: PSYCHIATRY & NEUROLOGY

## 2024-02-05 PROCEDURE — 1240000000 HC EMOTIONAL WELLNESS R&B

## 2024-02-05 RX ADMIN — MEMANTINE HYDROCHLORIDE 5 MG: 5 TABLET ORAL at 12:05

## 2024-02-05 RX ADMIN — Medication 5 MG: at 19:14

## 2024-02-05 RX ADMIN — DONEPEZIL HYDROCHLORIDE 10 MG: 5 TABLET, FILM COATED ORAL at 12:05

## 2024-02-05 RX ADMIN — Medication 1000 MCG: at 12:05

## 2024-02-05 RX ADMIN — MEMANTINE HYDROCHLORIDE 5 MG: 5 TABLET ORAL at 21:16

## 2024-02-05 NOTE — PLAN OF CARE
2/5/24 @ 0108  Pt calm and cooperative this shift. She does take her 2100 med whole and without issue. She is watched closely for cheeking the pill and it doesn't appear she had done that. Pt denies SI/HI and AVH and she is not noted to be RTIS. Pt denies pain currently. She is alert but only oriented to self during this nurses assessment. She remains continent and able to ambulate well with no assistive devices. Pt visible on the unit and noted to be socializing with peers. No behavioral issues this shift. Will cont to monitor.       Problem: Safety - Adult  Goal: Free from fall injury  Outcome: Progressing     Problem: ABCDS Injury Assessment  Goal: Absence of physical injury  Outcome: Progressing     Problem: Behavior  Goal: Pt/Family maintain appropriate behavior and adhere to behavioral management agreement, if implemented  Description: INTERVENTIONS:  1. Assess patient/family's coping skills and  non-compliant behavior (including use of illegal substances)  2. Notify security of behavior or suspected illegal substances which indicate the need for search of the family and/or belongings  3. Encourage verbalization of thoughts and concerns in a socially appropriate manner  4. Utilize positive, consistent limit setting strategies supporting safety of patient, staff and others  5. Encourage participation in the decision making process about the behavioral management agreement  6. If a visitor's behavior poses a threat to safety call refer to organization policy.  7. Initiate consult with , Psychosocial CNS, Spiritual Care as appropriate  Outcome: Progressing     Problem: Self Care Deficit  Goal: Return ADL status to a safe level of function  Description: INTERVENTIONS:  1. Administer medication as ordered  2. Assess ADL deficits and provide assistive devices as needed  3. Obtain PT/OT consults as needed  4. Assist and instruct patient to increase activity and self care as tolerated  Outcome:

## 2024-02-05 NOTE — GROUP NOTE
Group Therapy Note    Date: 2/5/2024    Group Start Time: 1100  Group End Time: 1145  Group Topic: Healthy Living/Wellness    [facility]    Marisela Cordova LPC        Group Therapy Note    Attendees: 7      Participants engaged in discussion about wellness outside of the hospital. Participants were able to identify ways to \"stay healthy\" and coping skills.            Notes:  Pt was an active listener throughout the group. Pt reported she was having trouble hearing and did not share insight.     Status After Intervention:  Unchanged    Participation Level: Active Listener    Participation Quality: Appropriate      Speech:  normal      Thought Process/Content: LEONIDES- pt did not share, pt reported having trouble hearing      Affective Functioning: Congruent      Mood: euthymic      Level of consciousness:  Alert      Response to Learning: LEONIDES, did not share, pt reported that she was having trouble hearing      Endings: None Reported    Modes of Intervention: Socialization and Exploration      Discipline Responsible: /Counselor      Signature:  Marisela Cordova LPC

## 2024-02-06 PROCEDURE — 6370000000 HC RX 637 (ALT 250 FOR IP): Performed by: PSYCHIATRY & NEUROLOGY

## 2024-02-06 PROCEDURE — 1240000000 HC EMOTIONAL WELLNESS R&B

## 2024-02-06 PROCEDURE — 99233 SBSQ HOSP IP/OBS HIGH 50: CPT | Performed by: PSYCHIATRY & NEUROLOGY

## 2024-02-06 RX ADMIN — MEMANTINE HYDROCHLORIDE 5 MG: 5 TABLET ORAL at 10:13

## 2024-02-06 RX ADMIN — Medication 1000 MCG: at 10:13

## 2024-02-06 RX ADMIN — Medication 5 MG: at 17:43

## 2024-02-06 RX ADMIN — DONEPEZIL HYDROCHLORIDE 10 MG: 5 TABLET, FILM COATED ORAL at 10:13

## 2024-02-06 RX ADMIN — MEMANTINE HYDROCHLORIDE 5 MG: 5 TABLET ORAL at 20:50

## 2024-02-06 NOTE — PLAN OF CARE
A+Ox3. +meds taken whole with water. Wrote down meds on paper for patient to read because she is hard of hearing. Visible on unit. Friendly with peers. Calm and cooperative. Ambulates independently. Good appetite.       Problem: Safety - Adult  Goal: Free from fall injury  2/6/2024 1018 by Michelle Albrecht RN  Outcome: Progressing  2/6/2024 0042 by Mikayla Hugo LPN  Outcome: Progressing     Problem: ABCDS Injury Assessment  Goal: Absence of physical injury  2/6/2024 1018 by Michelle Albrecht RN  Outcome: Progressing  2/6/2024 0042 by Mikayla Hugo LPN  Outcome: Progressing     Problem: Behavior  Goal: Pt/Family maintain appropriate behavior and adhere to behavioral management agreement, if implemented  Description: INTERVENTIONS:  1. Assess patient/family's coping skills and  non-compliant behavior (including use of illegal substances)  2. Notify security of behavior or suspected illegal substances which indicate the need for search of the family and/or belongings  3. Encourage verbalization of thoughts and concerns in a socially appropriate manner  4. Utilize positive, consistent limit setting strategies supporting safety of patient, staff and others  5. Encourage participation in the decision making process about the behavioral management agreement  6. If a visitor's behavior poses a threat to safety call refer to organization policy.  7. Initiate consult with , Psychosocial CNS, Spiritual Care as appropriate  2/6/2024 1018 by Michelle Albrecht RN  Outcome: Progressing  2/6/2024 0042 by Mikayla Hugo LPN  Outcome: Progressing     Problem: Self Care Deficit  Goal: Return ADL status to a safe level of function  Description: INTERVENTIONS:  1. Administer medication as ordered  2. Assess ADL deficits and provide assistive devices as needed  3. Obtain PT/OT consults as needed  4. Assist and instruct patient to increase activity and self care as tolerated  2/6/2024 1018 by Michelle Albrecht

## 2024-02-06 NOTE — GROUP NOTE
Group Therapy Note    Date: 2/6/2024    Group Start Time: 1100  Group End Time: 1145  Group Topic: Music Therapy    [facility]    Ziyad Franks        Group Therapy Note    Group facilitator provided live music stimuli in line with pt preferences. Reminiscence and reflective discussion semi-structured throughout.    Attendees: 6       Notes:  Sonia was present across session, disorganized verbal content throughout. Required consistent repeating of verbal content during discussions and reminiscence due to hearing impairment. Humorous and pleasant throughout.     Discipline Responsible: Psychoeducational Specialist      Signature:  Ziyad Franks, PREETI, MT-BC

## 2024-02-06 NOTE — PLAN OF CARE
Problem: Safety - Adult  Goal: Free from fall injury  Outcome: Progressing     Problem: ABCDS Injury Assessment  Goal: Absence of physical injury  Outcome: Progressing     Problem: Behavior  Goal: Pt/Family maintain appropriate behavior and adhere to behavioral management agreement, if implemented  Description: INTERVENTIONS:  1. Assess patient/family's coping skills and  non-compliant behavior (including use of illegal substances)  2. Notify security of behavior or suspected illegal substances which indicate the need for search of the family and/or belongings  3. Encourage verbalization of thoughts and concerns in a socially appropriate manner  4. Utilize positive, consistent limit setting strategies supporting safety of patient, staff and others  5. Encourage participation in the decision making process about the behavioral management agreement  6. If a visitor's behavior poses a threat to safety call refer to organization policy.  7. Initiate consult with , Psychosocial CNS, Spiritual Care as appropriate  Outcome: Progressing     Problem: Self Care Deficit  Goal: Return ADL status to a safe level of function  Description: INTERVENTIONS:  1. Administer medication as ordered  2. Assess ADL deficits and provide assistive devices as needed  3. Obtain PT/OT consults as needed  4. Assist and instruct patient to increase activity and self care as tolerated  Outcome: Progressing     Problem: Nutrition Deficit:  Goal: Optimize nutritional status  Outcome: Progressing

## 2024-02-07 PROCEDURE — 99233 SBSQ HOSP IP/OBS HIGH 50: CPT | Performed by: PSYCHIATRY & NEUROLOGY

## 2024-02-07 PROCEDURE — 6370000000 HC RX 637 (ALT 250 FOR IP): Performed by: PSYCHIATRY & NEUROLOGY

## 2024-02-07 PROCEDURE — 1240000000 HC EMOTIONAL WELLNESS R&B

## 2024-02-07 RX ADMIN — MEMANTINE HYDROCHLORIDE 5 MG: 5 TABLET ORAL at 08:30

## 2024-02-07 RX ADMIN — MEMANTINE HYDROCHLORIDE 5 MG: 5 TABLET ORAL at 20:08

## 2024-02-07 RX ADMIN — Medication 1000 MCG: at 08:30

## 2024-02-07 RX ADMIN — Medication 5 MG: at 20:08

## 2024-02-07 RX ADMIN — DONEPEZIL HYDROCHLORIDE 10 MG: 5 TABLET, FILM COATED ORAL at 08:30

## 2024-02-07 NOTE — PLAN OF CARE
Pt is alert and oriented to self and hospital. Pleasantly confused. Compliant with care and medications, no aggressive behaviors noted, good appetite, social with staff and peers. H.O.H. rested well last night.

## 2024-02-07 NOTE — GROUP NOTE
Group Therapy Note    Date: 2/7/2024    Group Start Time: 1000  Group End Time: 1045  Group Topic: Healthy Living/Wellness    [facility] [place] [facility] Ziyad Gomez        Group Therapy Note    Group facilitator led discussion of morning routines, preferred activities of self-care in the morning. Discussed resources for self-care, hygiene, nourishment and hydration.    Attendees: 6       Notes: Sonia reported feeling \"good enough\" and resting well overnight. Across session she was humorous while making funny faces to peers. Humorous across session when attempting to build a house of cards. Pleasant and engaging.      Discipline Responsible: Psychoeducational Specialist      Signature:  Ziyad Franks, MM, MT-BC

## 2024-02-07 NOTE — PLAN OF CARE
Problem: Safety - Adult  Goal: Free from fall injury  2/6/2024 2322 by Mikayla Hugo LPN  Outcome: Progressing     Problem: Self Care Deficit  Goal: Return ADL status to a safe level of function  Description: INTERVENTIONS:  1. Administer medication as ordered  2. Assess ADL deficits and provide assistive devices as needed  3. Obtain PT/OT consults as needed  4. Assist and instruct patient to increase activity and self care as tolerated  2/6/2024 2322 by Mikayla Hugo LPN  Outcome: Progressing     Problem: Nutrition Deficit:  Goal: Optimize nutritional status  2/6/2024 2322 by Mikayla Hugo LPN  Outcome: Progressing

## 2024-02-08 PROCEDURE — 99233 SBSQ HOSP IP/OBS HIGH 50: CPT | Performed by: PSYCHIATRY & NEUROLOGY

## 2024-02-08 PROCEDURE — 1240000000 HC EMOTIONAL WELLNESS R&B

## 2024-02-08 PROCEDURE — 6370000000 HC RX 637 (ALT 250 FOR IP): Performed by: PSYCHIATRY & NEUROLOGY

## 2024-02-08 RX ADMIN — MEMANTINE HYDROCHLORIDE 5 MG: 5 TABLET ORAL at 20:07

## 2024-02-08 RX ADMIN — MEMANTINE HYDROCHLORIDE 5 MG: 5 TABLET ORAL at 10:19

## 2024-02-08 RX ADMIN — Medication 1000 MCG: at 10:19

## 2024-02-08 RX ADMIN — Medication 5 MG: at 17:24

## 2024-02-08 RX ADMIN — DONEPEZIL HYDROCHLORIDE 10 MG: 5 TABLET, FILM COATED ORAL at 10:19

## 2024-02-08 NOTE — PLAN OF CARE
Pt is alert and oriented x 2, diminished hearing. Good appetite, received medications. No aggressive behaviors. Move about independently with steady gait. Attending groups. Social with staff and peers. No hallucinations or delusions noted.

## 2024-02-08 NOTE — GROUP NOTE
Group Therapy Note    Date: 2/8/2024    Group Start Time: 1100  Group End Time: 1145  Group Topic: Music Therapy    [facility]    Ziyad Franks        Group Therapy Note      COLLABORATIVE MUSIC MAKING    Group members were provided opportunity to play instruments while playing music in line with individual preferences.     Attendees: 3     Notes: Present and attentive to music-makers throughout session. Did not actively engage in live music-making.      Discipline Responsible: Psychoeducational Specialist      Signature:  Ziyad Franks, PREETI, MT-BC

## 2024-02-09 PROBLEM — R79.89 ELEVATED TROPONIN: Status: RESOLVED | Noted: 2024-01-30 | Resolved: 2024-02-09

## 2024-02-09 PROCEDURE — 1240000000 HC EMOTIONAL WELLNESS R&B

## 2024-02-09 PROCEDURE — 6370000000 HC RX 637 (ALT 250 FOR IP): Performed by: PSYCHIATRY & NEUROLOGY

## 2024-02-09 PROCEDURE — 99233 SBSQ HOSP IP/OBS HIGH 50: CPT | Performed by: PSYCHIATRY & NEUROLOGY

## 2024-02-09 RX ADMIN — Medication 5 MG: at 17:59

## 2024-02-09 RX ADMIN — MEMANTINE HYDROCHLORIDE 5 MG: 5 TABLET ORAL at 20:08

## 2024-02-09 RX ADMIN — DONEPEZIL HYDROCHLORIDE 10 MG: 5 TABLET, FILM COATED ORAL at 08:18

## 2024-02-09 RX ADMIN — MEMANTINE HYDROCHLORIDE 5 MG: 5 TABLET ORAL at 08:18

## 2024-02-09 RX ADMIN — Medication 1000 MCG: at 08:18

## 2024-02-09 NOTE — PLAN OF CARE
Pt is free from fall or injury.  She ambulates independently with a steady gait.  Problem: ABCDS Injury Assessment  Goal: Absence of physical injury  2/8/2024 2009 by Bere Mo, RN  Outcome: Progressing  2/8/2024 1058 by Abhijeet Noriega, RN  Outcome: Progressing

## 2024-02-09 NOTE — PLAN OF CARE
Problem: Safety - Adult  Goal: Free from fall injury  Outcome: Progressing     Problem: Self Care Deficit  Goal: Return ADL status to a safe level of function  Description: INTERVENTIONS:  1. Administer medication as ordered  2. Assess ADL deficits and provide assistive devices as needed  3. Obtain PT/OT consults as needed  4. Assist and instruct patient to increase activity and self care as tolerated  Outcome: Progressing    Pt has been visible on the unit. She has been medication compliant. She is oriented to self only. She has eaten breakfast in the day room amongst peers. She is independent with ambulation. Pt reports she slept well and she denies pain. Denies needs currently

## 2024-02-10 PROCEDURE — 1240000000 HC EMOTIONAL WELLNESS R&B

## 2024-02-10 PROCEDURE — 6370000000 HC RX 637 (ALT 250 FOR IP): Performed by: PSYCHIATRY & NEUROLOGY

## 2024-02-10 PROCEDURE — 99232 SBSQ HOSP IP/OBS MODERATE 35: CPT

## 2024-02-10 RX ADMIN — MEMANTINE HYDROCHLORIDE 5 MG: 5 TABLET ORAL at 10:37

## 2024-02-10 RX ADMIN — MEMANTINE HYDROCHLORIDE 5 MG: 5 TABLET ORAL at 20:23

## 2024-02-10 RX ADMIN — DONEPEZIL HYDROCHLORIDE 10 MG: 5 TABLET, FILM COATED ORAL at 10:37

## 2024-02-10 RX ADMIN — Medication 1000 MCG: at 10:37

## 2024-02-10 NOTE — PLAN OF CARE
Pt free from falls and injury.  She ambulates independently with a steady gait.    Problem: Safety - Adult  Goal: Free from fall injury  2/9/2024 2022 by Bere Mo, RN  Outcome: Progressing  2/9/2024 0833 by Kacie Garcia, RN  Outcome: Progressing

## 2024-02-11 PROCEDURE — 6370000000 HC RX 637 (ALT 250 FOR IP): Performed by: PSYCHIATRY & NEUROLOGY

## 2024-02-11 PROCEDURE — 1240000000 HC EMOTIONAL WELLNESS R&B

## 2024-02-11 RX ADMIN — MEMANTINE HYDROCHLORIDE 5 MG: 5 TABLET ORAL at 08:57

## 2024-02-11 RX ADMIN — Medication 1000 MCG: at 08:57

## 2024-02-11 RX ADMIN — MEMANTINE HYDROCHLORIDE 5 MG: 5 TABLET ORAL at 20:56

## 2024-02-11 RX ADMIN — DONEPEZIL HYDROCHLORIDE 10 MG: 5 TABLET, FILM COATED ORAL at 08:57

## 2024-02-12 PROCEDURE — 99233 SBSQ HOSP IP/OBS HIGH 50: CPT | Performed by: PSYCHIATRY & NEUROLOGY

## 2024-02-12 PROCEDURE — 1240000000 HC EMOTIONAL WELLNESS R&B

## 2024-02-12 PROCEDURE — 6370000000 HC RX 637 (ALT 250 FOR IP): Performed by: PSYCHIATRY & NEUROLOGY

## 2024-02-12 RX ADMIN — Medication 1000 MCG: at 08:17

## 2024-02-12 RX ADMIN — MEMANTINE HYDROCHLORIDE 5 MG: 5 TABLET ORAL at 08:17

## 2024-02-12 RX ADMIN — MEMANTINE HYDROCHLORIDE 5 MG: 5 TABLET ORAL at 21:25

## 2024-02-12 RX ADMIN — DONEPEZIL HYDROCHLORIDE 10 MG: 5 TABLET, FILM COATED ORAL at 08:17

## 2024-02-12 RX ADMIN — Medication 5 MG: at 21:25

## 2024-02-12 NOTE — PLAN OF CARE
Patient remains free of physical injury. No falls. Patient has steady gait . Fall risk sign posted and gripper socks on feet. Patient is wearing hearing aids to assist with orientation and communication. Encouraged to ask for help if needed. Patient is continent of bowel and bladder.

## 2024-02-12 NOTE — PLAN OF CARE
[facility]  Week Interdisciplinary Treatment Plan Note     Review Date & Time: 2/12/24 0910     Patient was not in treatment team.    Estimated Length of Stay Update:  1-2 days   Estimated Discharge Date Update: 2/13/24-2/14/24    EDUCATION:   Learner Progress Toward Treatment Goals: Reviewed results and recommendations of this team    Method: Individual    Outcome: Verbalized understanding    PATIENT GOALS: none expressed    PLAN/TREATMENT RECOMMENDATIONS UPDATE: continue treatment    GOALS UPDATE:  Time frame for Short-Term Goals: 1 day      Abhijeet Noriega RN

## 2024-02-12 NOTE — PLAN OF CARE
Problem: Safety - Adult  Goal: Free from fall injury  2/12/2024 0944 by Kacie Garcia RN  Outcome: Progressing     Problem: Behavior  Goal: Pt/Family maintain appropriate behavior and adhere to behavioral management agreement, if implemented  Description: INTERVENTIONS:  1. Assess patient/family's coping skills and  non-compliant behavior (including use of illegal substances)  2. Notify security of behavior or suspected illegal substances which indicate the need for search of the family and/or belongings  3. Encourage verbalization of thoughts and concerns in a socially appropriate manner  4. Utilize positive, consistent limit setting strategies supporting safety of patient, staff and others  5. Encourage participation in the decision making process about the behavioral management agreement  6. If a visitor's behavior poses a threat to safety call refer to organization policy.  7. Initiate consult with , Psychosocial CNS, Spiritual Care as appropriate  Outcome: Progressing    Pt has been visible on the unit. She has been medication compliant. She has been calm, pleasant, and cooperative with staff. She is oriented to self only. She has eaten breakfast in the day room and is currently watching tv. She denies SI/HI/AVH and no RTIS noted. Pt is [place] and has a difficult time understanding staff even with hearing aids. Pt does well when staff write information down for her. Denies needs/pain currently
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
## 2024-02-13 VITALS
DIASTOLIC BLOOD PRESSURE: 83 MMHG | OXYGEN SATURATION: 99 % | WEIGHT: 145 LBS | TEMPERATURE: 97.9 F | HEART RATE: 64 BPM | BODY MASS INDEX: 27.38 KG/M2 | RESPIRATION RATE: 16 BRPM | SYSTOLIC BLOOD PRESSURE: 106 MMHG | HEIGHT: 61 IN

## 2024-02-13 PROCEDURE — 6370000000 HC RX 637 (ALT 250 FOR IP): Performed by: PSYCHIATRY & NEUROLOGY

## 2024-02-13 PROCEDURE — 5130000000 HC BRIDGE APPOINTMENT

## 2024-02-13 RX ORDER — DONEPEZIL HYDROCHLORIDE 10 MG/1
10 TABLET, FILM COATED ORAL DAILY
Qty: 30 TABLET | Refills: 0
Start: 2024-02-14

## 2024-02-13 RX ORDER — MECOBALAMIN 5000 MCG
5 TABLET,DISINTEGRATING ORAL EVERY EVENING
Qty: 30 TABLET | Refills: 0
Start: 2024-02-13 | End: 2024-03-14

## 2024-02-13 RX ORDER — MEMANTINE HYDROCHLORIDE 5 MG/1
5 TABLET ORAL 2 TIMES DAILY
Qty: 60 TABLET | Refills: 0
Start: 2024-02-13

## 2024-02-13 RX ADMIN — MEMANTINE HYDROCHLORIDE 5 MG: 5 TABLET ORAL at 08:03

## 2024-02-13 RX ADMIN — DONEPEZIL HYDROCHLORIDE 10 MG: 5 TABLET, FILM COATED ORAL at 08:03

## 2024-02-13 RX ADMIN — Medication 1000 MCG: at 08:03

## 2024-02-13 NOTE — PROGRESS NOTES
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 Problems:    Elevated troponin     Plan:  1.  Admitted to senior psychiatry for further evaluation.    2. On admission, started Aricept QAM and melatonin QHS. Ordered q15min checks for 
Department of Psychiatry  Progress Note    Patient's chart was reviewed. Discussed with treatment team. Met with patient.     SUBJECTIVE:      Behaviorally stable.    Oriented to self and place.    Scored 14 on MoCA today but is MINISTERIO.    Tolerating increased dose of Aricept.      Folate WNL. B12 a little low.     ROS:   Patient has new complaints: no  Sleeping adequately:  Yes   Appetite adequate: Yes  Engaged in programming: Yes    OBJECTIVE:  VITALS:  BP (!) 140/64 Comment: RN notified  Pulse 60   Temp 98.2 °F (36.8 °C) (Oral)   Resp 17   Ht 1.549 m (5' 1\")   Wt 65.8 kg (145 lb)   SpO2 95%   BMI 27.40 kg/m²     Mental Status Examination:    Appearance: in milieu  Behavior/Attitude toward examiner:  cooperative, attentive and fair eye contact  Speech:  spontaneous, normal rate  Mood:  \"ok\"  Affect:  Mood congruent   Thought processes:  Goal directed, linear  Thought Content: no SI, no HI, no delusions voiced, no obsessions,  Perceptions: no AVH, not RTIS  Attention: attention span and concentration were intact to interview   Abstraction: limited   Cognition: impaired  Insight: impaired insight   Judgment: impaired judgment     Medication:  Scheduled:   donepezil  10 mg Oral Daily    melatonin  5 mg Oral QPM        PRN:  acetaminophen, magnesium hydroxide, nicotine polacrilex, aluminum & magnesium hydroxide-simethicone, OLANZapine **OR** OLANZapine (ZyPREXA) 2.5 mg in sterile water 0.5 mL injection, hydrOXYzine HCl     Formulation:  domiciled, , and retired 87yo without medical/psychiatric history who brought to our ED with worsening cognitive dysfunction.      Principal Problem:    Dementia with behavioral disturbance (HCC)  Active Problems:    Elevated BP without diagnosis of hypertension    Elevated troponin  Resolved Problems:    * No resolved hospital problems. *     Plan:  1.  Admitted to senior psychiatry for further evaluation.    2. On admission, started Aricept QAM and melatonin QHS. Ordered 
Department of Psychiatry  Progress Note    Patient's chart was reviewed. Discussed with treatment team. Met with patient.     SUBJECTIVE:      Oriented to self only.    Pleasant and behaviorally stable in the structured milieu. Holding hands with two peer women, and walking the unit.     No spontaneous complaints.     Tolerating b12 replacement well so far.     ROS:   Patient has new complaints: no  Sleeping adequately:  Yes   Appetite adequate: Yes  Engaged in programming: Yes    OBJECTIVE:  VITALS:  /65   Pulse 78   Temp 98.1 °F (36.7 °C) (Oral)   Resp 18   Ht 1.549 m (5' 1\")   Wt 65.8 kg (145 lb)   SpO2 95%   BMI 27.40 kg/m²     Mental Status Examination:    Appearance: in milieu  Behavior/Attitude toward examiner:  pleasant   Speech:  spontaneous, normal rate  Mood:  \"fine\"  Affect:  Mood congruent   Thought processes:  Goal directed, linear  Thought Content: no SI, no HI, no delusions voiced, no obsessions,  Perceptions: no AVH, not RTIS  Attention: attention span and concentration were intact to interview   Abstraction: limited   Cognition: impaired  Insight: impaired insight   Judgment: impaired judgment     Medication:  Scheduled:   vitamin B-12  1,000 mcg Oral Daily    donepezil  10 mg Oral Daily    melatonin  5 mg Oral QPM        PRN:  acetaminophen, magnesium hydroxide, nicotine polacrilex, aluminum & magnesium hydroxide-simethicone, OLANZapine **OR** OLANZapine (ZyPREXA) 2.5 mg in sterile water 0.5 mL injection, hydrOXYzine HCl     Formulation:  domiciled, , and retired 89yo without medical/psychiatric history who brought to our ED with worsening cognitive dysfunction.      Principal Problem:    Dementia with behavioral disturbance (HCC)  Active Problems:    Elevated BP without diagnosis of hypertension    Elevated troponin    Low serum vitamin B12  Resolved Problems:    * No resolved hospital problems. *     Plan:  1.  Admitted to senior psychiatry for further evaluation.    2. On 
Department of Psychiatry  Progress Note    Patient's chart was reviewed. Discussed with treatment team. Met with patient.     SUBJECTIVE:      Oriented to self, place, and somewhat to circumstance.    Believes her daughter sent her here because she wants to take possesion of her house and money. \"I think she'd kill me to do it too.\"    Denies having knives in her couch. Also denies carrying one around when she take her dog for a walk.     ROS:   Patient has new complaints: no  Sleeping adequately:  Yes   Appetite adequate: Yes  Engaged in programming: Yes    OBJECTIVE:  VITALS:  BP (!) 145/89   Pulse 77   Temp 98.2 °F (36.8 °C) (Oral)   Resp 16   Ht 1.549 m (5' 1\")   Wt 65.8 kg (145 lb)   SpO2 94%   BMI 27.40 kg/m²     Mental Status Examination:    Appearance: in milieu  Behavior/Attitude toward examiner:  cooperative, attentive and fair eye contact  Speech:  spontaneous, normal rate  Mood:  \"ok\"  Affect:  Mood congruent   Thought processes:  Goal directed, linear  Thought Content: no SI, no HI, no delusions voiced, no obsessions,  Perceptions: no AVH, not RTIS  Attention: attention span and concentration were intact to interview   Abstraction: limited   Cognition: impaired  Insight: impaired insight   Judgment: impaired judgment     Medication:  Scheduled:   melatonin  5 mg Oral QPM    donepezil  5 mg Oral Daily        PRN:  acetaminophen, magnesium hydroxide, nicotine polacrilex, aluminum & magnesium hydroxide-simethicone, OLANZapine **OR** OLANZapine (ZyPREXA) 2.5 mg in sterile water 0.5 mL injection, hydrOXYzine HCl     Formulation:  domiciled, , and retired 89yo without medical/psychiatric history who brought to our ED with worsening cognitive dysfunction.      Principal Problem:    Dementia with behavioral disturbance (HCC)  Active Problems:    Elevated BP without diagnosis of hypertension    Elevated troponin  Resolved Problems:    * No resolved hospital problems. *     Plan:  1.  Admitted to 
Department of Psychiatry  Progress Note    Patient's chart was reviewed. Discussed with treatment team. Met with patient.     SUBJECTIVE:      Overall remains behaviorally and psychiatrically stable.    Has hearing aids now.     Present and active in the milieu.    ROS:   Patient has new complaints: no  Sleeping adequately:  Yes   Appetite adequate: Yes  Engaged in programming: Yes    OBJECTIVE:  VITALS:  BP (!) 113/57   Pulse 71   Temp 97.8 °F (36.6 °C) (Oral)   Resp 16   Ht 1.549 m (5' 0.98\")   Wt 65.8 kg (145 lb)   SpO2 98%   BMI 27.41 kg/m²     Mental Status Examination:    Appearance: in milieu  Behavior/Attitude toward examiner:  pleasant. Respond appropriately to social cues.  Speech:  spontaneous, normal rate  Mood:  \"fine\"  Affect:  Mood congruent   Thought processes:  Goal directed, linear  Thought Content: no SI, no HI, no delusions voiced, no obsessions,  Perceptions: no AVH, not RTIS  Attention: attention span and concentration were intact to interview   Abstraction: limited   Cognition: impaired  Insight: impaired  Judgment: impaired      Medication:  Scheduled:   memantine  5 mg Oral BID    vitamin B-12  1,000 mcg Oral Daily    donepezil  10 mg Oral Daily    melatonin  5 mg Oral QPM        PRN:  acetaminophen, magnesium hydroxide, nicotine polacrilex, aluminum & magnesium hydroxide-simethicone, OLANZapine **OR** OLANZapine (ZyPREXA) 2.5 mg in sterile water 0.5 mL injection, hydrOXYzine HCl     Formulation:  domiciled, , and retired 89yo without medical/psychiatric history who brought to our ED with worsening cognitive dysfunction.      Principal Problem:    Dementia with behavioral disturbance (HCC)  Active Problems:    Elevated BP without diagnosis of hypertension    Low serum vitamin B12  Resolved Problems:    Elevated troponin     Plan:  1.  Admitted to senior psychiatry for further evaluation.    2. On admission, started Aricept QAM and melatonin QHS. Ordered q15min checks for 
Department of Psychiatry  Progress Note    Patient's chart was reviewed. Discussed with treatment team. Met with patient.     SUBJECTIVE:      Present in the milieu.     Responds appropriately to social cues.    Behaviorally stable.    Unable to care for herself without a structured environment.  Placement pending.     ROS:   Patient has new complaints: no  Sleeping adequately:  Yes   Appetite adequate: Yes  Engaged in programming: Yes    OBJECTIVE:  VITALS:  BP (!) 120/51   Pulse 78   Temp 98.7 °F (37.1 °C) (Temporal)   Resp 16   Ht 1.549 m (5' 0.98\")   Wt 65.8 kg (145 lb)   SpO2 95%   BMI 27.41 kg/m²     Mental Status Examination:    Appearance: in milieu  Behavior/Attitude toward examiner:  pleasant   Speech:  spontaneous, normal rate  Mood:  \"great\"  Affect:  Mood congruent   Thought processes:  Goal directed, linear  Thought Content: no SI, no HI, no delusions voiced, no obsessions,  Perceptions: no AVH, not RTIS  Attention: attention span and concentration were intact to interview   Abstraction: limited   Cognition: impaired  Insight: impaired  Judgment: impaired      Medication:  Scheduled:   memantine  5 mg Oral BID    vitamin B-12  1,000 mcg Oral Daily    donepezil  10 mg Oral Daily    melatonin  5 mg Oral QPM        PRN:  acetaminophen, magnesium hydroxide, nicotine polacrilex, aluminum & magnesium hydroxide-simethicone, OLANZapine **OR** OLANZapine (ZyPREXA) 2.5 mg in sterile water 0.5 mL injection, hydrOXYzine HCl     Formulation:  domiciled, , and retired 89yo without medical/psychiatric history who brought to our ED with worsening cognitive dysfunction.      Principal Problem:    Dementia with behavioral disturbance (HCC)  Active Problems:    Elevated BP without diagnosis of hypertension    Low serum vitamin B12  Resolved Problems:    Elevated troponin     Plan:  1.  Admitted to senior psychiatry for further evaluation.    2. On admission, started Aricept QAM and melatonin QHS. Ordered 
Department of Psychiatry  Progress Note    Patient's chart was reviewed. Discussed with treatment team. Met with patient.     SUBJECTIVE:      Remains behaviorally and psychiatrically stable.    Active in the milieu.    Oriented to self and somewhat to place.     ROS:   Patient has new complaints: no  Sleeping adequately:  Yes   Appetite adequate: Yes  Engaged in programming: Yes    OBJECTIVE:  VITALS:  /68   Pulse 70   Temp 98.2 °F (36.8 °C) (Temporal)   Resp 16   Ht 1.549 m (5' 0.98\")   Wt 65.8 kg (145 lb)   SpO2 97%   BMI 27.41 kg/m²     Mental Status Examination:    Appearance: in milieu  Behavior/Attitude toward examiner:  pleasant   Speech:  spontaneous, normal rate  Mood:  \"fine\"  Affect:  Mood congruent   Thought processes:  Goal directed, linear  Thought Content: no SI, no HI, no delusions voiced, no obsessions,  Perceptions: no AVH, not RTIS  Attention: attention span and concentration were intact to interview   Abstraction: limited   Cognition: impaired  Insight: impaired insight   Judgment: impaired judgment     Medication:  Scheduled:   memantine  5 mg Oral BID    vitamin B-12  1,000 mcg Oral Daily    donepezil  10 mg Oral Daily    melatonin  5 mg Oral QPM        PRN:  acetaminophen, magnesium hydroxide, nicotine polacrilex, aluminum & magnesium hydroxide-simethicone, OLANZapine **OR** OLANZapine (ZyPREXA) 2.5 mg in sterile water 0.5 mL injection, hydrOXYzine HCl     Formulation:  domiciled, , and retired 89yo without medical/psychiatric history who brought to our ED with worsening cognitive dysfunction.      Principal Problem:    Dementia with behavioral disturbance (HCC)  Active Problems:    Elevated BP without diagnosis of hypertension    Elevated troponin    Low serum vitamin B12  Resolved Problems:    * No resolved hospital problems. *     Plan:  1.  Admitted to senior psychiatry for further evaluation.    2. On admission, started Aricept QAM and melatonin QHS. Ordered q15min 
Nutrition Assessment     Type and Reason for Visit: Reassess    Nutrition Recommendations/Plan:   Continue ADULT DIET; Regular diet order - please document meal intake % in flow sheets for each meal.   Continue Ensure Enlive with meals - please document ONS intake % in flow sheets.   Monitor appetite, meal intake, and acceptance/intake of ONS.   Please obtain an actual, current weight for this patient - weight obtained on 1/29/24 was stated.      Malnutrition Assessment:  Malnutrition Status: At risk for malnutrition    Nutrition Assessment:  patient is improved from a nutritional standpoint AEB improved mental status and appetite + po intake during this admission and she is currently at low-risk for further nutritional compromise at this time; will continue ADULT DIET; Regular diet order and Ensure Enlive with meals    Estimated Daily Nutrient Needs:  Energy (kcal):  1315 - 1513 kcals based on 20-23 kcals/kg/CBW Weight Used for Energy Requirements: Current (based on a stated weight of 65.772 kg)     Protein (g):  58 - 72 g protein based on 1.2-1.5 g/kg/IBW Weight Used for Protein Requirements: Ideal        Fluid (ml/day):  1315 - 1513 ml Method Used for Fluid Requirements: 1 ml/kcal    Nutrition Related Findings:   patient is A & O x person and place; patient's appetite and po intake are improved; patient is able to ambulate on the unit independently; she is attending groups and interacting with staff and peers Wound Type: None    Current Nutrition Therapies:    ADULT DIET; Regular  ADULT ORAL NUTRITION SUPPLEMENT; Breakfast, Lunch, Dinner; Standard High Calorie/High Protein Oral Supplement    Anthropometric Measures:  Height: 154.9 cm (5' 0.98\")  Current Body Wt: 65.8 kg (145 lb) (obtained on 1/29/24; stated weight)   BMI: 27.4    Nutrition Diagnosis:   Inadequate oral intake related to inadequate protein-energy intake, cognitive or neurological impairment as evidenced by poor intake prior to admission, intake 
Pt A+Ox2, [place], cooperative and medication compliant.  She is visible on the unit.  She denies SI/HI/AVH and no RTIS noted.  She is currently resting in her room and denies any needs at this time.  
Pt A+Ox2, [place], visible on unit watching TV, cooperative and medication compliant.  She denies SI/HI/AVH and no RTIS noted.  She denies any needs at this time.  
Pt A+Ox2, visible on the unit watching TV and socializing with peers.  She was medication compliant.  She denies SI/HI/AVH and no RTIS noted.  She was provided with a snack and denies any needs at this time.  
Pt declining melatonin at this time. Pt states she took a nap earlier and will take it later before bed   
Pt has been pleasant and cooperative with staff. She denies SI/HI/AVH. She has been medication compliant. Pt discharged at 1140   
Pt's daughter brought in hearing aids for pt  
Recent nursing notes and current medication list faxed to [facility]   
Referrals sent to [facility] and the [facility] for long-term care placement. Patient will be private pay.    Spoke with daughter/JEZ Velazco. She is agreeable to referrals but would prefer a secured unit placement, due to patients tendency to wander.     Daughter will bring POA paperwork to hospital during next visitation to be scanned and placed in medical record.     MERLINE Samuels  
B12  Resolved Problems:    * No resolved hospital problems. *     Plan:  1.  Admitted to senior psychiatry for further evaluation.    2. On admission, started Aricept QAM and melatonin QHS. Ordered q15min checks for safety, programming, and prn medication for anxiety, agitation, and insomnia. MoCA. B12/folate. Consider Brain MRI. CT shows enlarged ventricles.     1/31/2024 - Oriented to self, place, and somewhat to circumstance. Believes her daughter sent her here because she wants to take possesion of her house and money. \"I think she'd kill me to do it too.\" Denies having knives in her couch. Also denies carrying one around when she take her dog for a walk.   - b12/folate/MoCA pending.   - increase Aricept to 10mg QAM    2/1/2024 - Behaviorally stable. Oriented to self and place. Scored 14 on MoCA today but is MINISTERIO. Tolerating increased dose of Aricept.  Folate WNL. B12 a little low.     2/2/2024 - Met with patient and her daughter.  Daughter says she's the only support the patient has at this point. Is her POA for finances and believes the paperwork designates her as POA for some medical decisions too.  Patient remains behaviorally stable. Active in the milieu and some groups.  Start oral b12 replacement.     3. Hospitals consult for admission.   Elevated BP  -monitor BP for now    Elevated troponin   -23-->20  -denies chest pain, dyspnea  -no acute ischemic changes on EKG     4. Collateral collected from daughter.    5. Voluntary by POA (daughter)?  Daughter has paperwork.    Total time with patient was 50 minutes and more than 50 % of that time was spent counseling the patient on their symptoms, treatment, and expected goals.     Ziyad Dumont MD

## 2024-02-13 NOTE — PLAN OF CARE
Patient was out with patient group, early in  the shift & attempted to be social. Patient was extremely heard of hearing, But could hear writer when writer spoke to patient near her left ear. Patient declined to wear her hearing aids. Patient was oriented to person only & thought she was at [facility]. Patient verbal in 1:1 & reported that she was here, because her daughter sent her here.\"She wants my money.\" Patient's gait was steady. Patient declined having any pain. Patient went to the bathroom per self & voided. Patient has been pleasant & cooperative. Ayanna Garber R.N.  Problem: Safety - Adult  Goal: Free from fall injury  Outcome: Progressing     Problem: ABCDS Injury Assessment  Goal: Absence of physical injury  Outcome: Progressing     Problem: Behavior  Goal: Pt/Family maintain appropriate behavior and adhere to behavioral management agreement, if implemented  Description: INTERVENTIONS:  1. Assess patient/family's coping skills and  non-compliant behavior (including use of illegal substances)  2. Notify security of behavior or suspected illegal substances which indicate the need for search of the family and/or belongings  3. Encourage verbalization of thoughts and concerns in a socially appropriate manner  4. Utilize positive, consistent limit setting strategies supporting safety of patient, staff and others  5. Encourage participation in the decision making process about the behavioral management agreement  6. If a visitor's behavior poses a threat to safety call refer to organization policy.  7. Initiate consult with , Psychosocial CNS, Spiritual Care as appropriate  Outcome: Progressing     Problem: Self Care Deficit  Goal: Return ADL status to a safe level of function  Description: INTERVENTIONS:  1. Administer medication as ordered  2. Assess ADL deficits and provide assistive devices as needed  3. Obtain PT/OT consults as needed  4. Assist and instruct patient to increase

## 2024-02-13 NOTE — BH NOTE
Group Therapy Note     Date: 2/9/2024     Group Start Time: 1000  Group End Time: 1045  Group Topic: Music Therapy     [facility]    Ziyad Franks           Group Therapy Note        COLLABORATIVE MUSIC MAKING     Group members were provided opportunity to play instruments while playing music in line with individual preferences.      Attendees: 3     Notes: Present and attentive to music-makers throughout session, observed singing along quietly to herself. Repeatedly stated \"My GOD you're talented\" to other pt making music.        Discipline Responsible: Psychoeducational Specialist        Signature:  Ziyad Franks, PREETI, MT-BC  
Bedside Mobility Assessment Tool (BMAT):     Assessment Level 1- Sit and Shake    1. From a semi-reclined position, ask patient to sit up and rotate to a seated position at the side of the bed. Can use the bedrail.    2. Ask patient to reach out and grab your hand and shake making sure patient reaches across his/her midline.   Pass- Patient is able to come to a seated position, maintain core strength. Maintains seated balance while reaching across midline. Move on to Assessment Level 2.     Assessment Level 2- Stretch and Point   1. With patient in seated position at the side of the bed, have patient place both feet on the floor (or stool) with knees no higher than hips.    2. Ask patient to stretch one leg and straighten the knee, then bend the ankle/flex and point the toes. If appropriate, repeat with the other leg.   Pass- Patient is able to demonstrate appropriate quad strength on intended weight bearing limb(s). Move onto Assessment Level 3.     Assessment Level 3- Stand   1. Ask patient to elevate off the bed or chair (seated to standing) using an assistive device (cane, bedrail).    2. Patient should be able to raise buttocks off be and hold for a count of five. May repeat once.   Pass- Patient maintains standing stability for at least 5 seconds, proceed to assessment level 4.    Assessment Level 4- Walk   1. Ask patient to march in place at bedside.    2. Then ask patient to advance step and return each foot. Some medical conditions may render a patient from stepping backwards, use your best clinical judgement.   Pass- Patient demonstrates balance while shifting weight and ability to step, takes independent steps, does not use assistive device patient is MOBILITY LEVEL 4.      Mobility Level- 4  
[facility]  Treatment Team Note  Day 7    Review Date & Time: 2/5/2024  0900    Patient was not in treatment team      Status EXAM:   Mental Status and Behavioral Exam  Normal: No  Level of Assistance: Independent/Self  Facial Expression: Flat  Affect: Congruent  Level of Consciousness: Alert  Frequency of Checks: 4 times per hour, close  Mood:Normal: Yes  Motor Activity:Normal: Yes  Motor Activity: Decreased  Eye Contact: Fair  Observed Behavior: Cooperative, Withdrawn  Sexual Misconduct History: Current - no  Preception: [place] to person  Attention:Normal: No  Attention: Distractible  Thought Processes: Unremarkable  Thought Content:Normal: No  Thought Content: Poverty of content  Depression Symptoms: No problems reported or observed.  Anxiety Symptoms: No problems reported or observed.  Khadijah Symptoms: No problems reported or observed.  Hallucinations: None  Delusions: No  Memory:Normal: No  Memory: Poor recent, Poor remote  Insight and Judgment: No  Insight and Judgment: Poor judgment, Poor insight      Suicide Risk CSSR-S:  1) Within the past month, have you wished you were dead or wished you could go to sleep and not wake up? : No  2) Have you actually had any thoughts of killing yourself? : No  3) Have you been thinking about how you might kill yourself? : No  5) Have you started to work out or worked out the details of how to kill yourself? Do you intend to carry out this plan? : No  6) Have you ever done anything, started to do anything, or prepared to do anything to end your life?: No      PLAN/TREATMENT RECOMMENDATIONS UPDATE:   Patient will take medication as prescribed, eat 75% of meals, attend groups, participate in milieu activities, participate in treatment team and care planning for discharge and follow up.            Moses Guzman RN    
Left vm message for Alesha at [facility] requesting a return call to confirm receipt of the referral faxed this am.   
No SS on file. Called daughter, Juan A, who provided number. Gave information to Gudelia in registration to update record.     MERLINE Samuels  
Patient alert and oriented to person place and time just not year.Pleasant and cooperative.Visible on unit. Steady gait. Independent continent needs prompted at night to go to bathroom. Hard of hearing. Denies burning with urination.Last BM today. Denies anxiety reported a little depressed because she wants to go home. Denies suicidal, homicidal ideation, auditory or visible hallucinations. No distress noted, will monitor.   
Patient alert and oriented to person place and time. Pleasant and cooperative with care, visible on unit, watching TV with the other patients. Compliant with medication.Denies anxiety, depression or thoughts of self harm. Denies seeing or hearing things that are not there, in bed at 2226, will monitor.  
Patient alert to self only and [place]. Pleasant, no PM medications. Independent, continent during the day, visible on unit social with peers but quiet. Refuse snack but did drink an ensure. Fell asleep in chair encouraged to go to bed which she readily agreed,settled in bed and quickly to sleep. Steady gait. Denies anxiety or  depression. Denies any thoughts of self harm, hearing voices or seeing things. Respirations even and unlabored, skin warm and dry, will monitor.  
Pt alert and oriented to person and place, pleasant, [place], compliant with medication.Visible on unit, independent, continent, wake her at night for bathroom, gait steady. Denies suicidal or homicidal ideation, denies visual or auditory hallucinations.No discomfort noted. Will monitor.  
Pt arrived to the unit via w/c accompanied by Beverly MITCHELL and . Pt is alert and oriented to self and place (hospital). Pt is pleasant, calm, and cooperative at this time. Pt has been sitting in the dayroom w/ peers and is eating dinner at this time. Will continue to monitor.  
Referral sent to Alesha at [facility].    MERLINE Samuels  
Spoke with Christiano in admissions at [facility]. They reviewed pt's clinical and should be able to accept her on Monday if that is the route family wants to take.       Spoke with Massiel at [facility] ([phone]), and they have no long-term beds available at this time.        Spoke with Kallie at [facility] ([phone]), and they would like to complete an onsite visit with pt. If the visit goes well, they should be able to accept pt early next week       Left vm message for Carolina Poole with [facility] requesting a return call to discuss referral for pt.       Spoke with pt's daughter Juan A and updated her on the placement progress.           
Spoke with patients daughter, Juan A, on placement update.     Tiffany from [facility] is going to meet with the patient today. If that meeting goes well, Juan A approves placement. She visited the location yesterday and said it was beautiful, and they will allow the patient to bring her dog (with vets approval).     MELRINE Samuels  
Spoke with pt's daughter Juan A and obtained update on placement decision. She toured [facility] today. She margot talk to her daughter and make a decision tonight.       Spoke with Ksenia in admissions at [facility] and updated her on pt's status.      13:15 - Spoke with pt's daughter Juan A, and the family has officially decided on placement at [facility].      14:00 - Spoke with Ksenia in admissions at [facility] and confirmed pt's DC to them tomorrow with transport set for 12:30 pm.  
Suzie ochoa [facility] will be in to assess Sonia sometime over the weekend.   
Tiffany from [facility] was on the unit to meet with patient.    Patient has been accepted for placement and could accept the patient as early as Thursday.     Daughter/POA, Juan A, has been notified and plans to have [facility]'s paperwork completed by Thursday.    MERLINE Samuels      
left vm message for daughter informing her of updated transport time (11:30 instead of 12:30).      AVS faxed to Ksenia at [facility].   
spoke with Ksenia([phone]) in admissions at [facility] and provided contact info for pt's JEZ Velazco.   
not improve. Juan A is concerned for patients safely and wellbeing and feels that a secure memory care unit may be the least restrictive and safest environment for the patient at this time. She reports that patient would be private pay, as she has $100,000 in the bank and receives $1,800 a month in SSI.    MERLINE Samuels

## 2024-02-14 ENCOUNTER — FOLLOWUP TELEPHONE ENCOUNTER (OUTPATIENT)
Dept: PSYCHIATRY | Age: 89
End: 2024-02-14

## 2024-02-28 ENCOUNTER — APPOINTMENT (OUTPATIENT)
Dept: GENERAL RADIOLOGY | Age: 89
DRG: 956 | End: 2024-02-28
Payer: MEDICARE

## 2024-02-28 ENCOUNTER — HOSPITAL ENCOUNTER (INPATIENT)
Age: 89
LOS: 5 days | Discharge: NURSING, REHAB OR LONG-TERM CARE | DRG: 956 | End: 2024-03-04
Attending: EMERGENCY MEDICINE | Admitting: STUDENT IN AN ORGANIZED HEALTH CARE EDUCATION/TRAINING PROGRAM
Payer: MEDICARE

## 2024-02-28 ENCOUNTER — APPOINTMENT (OUTPATIENT)
Dept: CT IMAGING | Age: 89
DRG: 956 | End: 2024-02-28
Payer: MEDICARE

## 2024-02-28 ENCOUNTER — ANESTHESIA EVENT (OUTPATIENT)
Dept: OPERATING ROOM | Age: 89
End: 2024-02-28
Payer: MEDICARE

## 2024-02-28 ENCOUNTER — APPOINTMENT (OUTPATIENT)
Dept: CT IMAGING | Age: 89
DRG: 956 | End: 2024-02-28
Attending: EMERGENCY MEDICINE
Payer: MEDICARE

## 2024-02-28 ENCOUNTER — ANESTHESIA (OUTPATIENT)
Dept: OPERATING ROOM | Age: 89
End: 2024-02-28
Payer: MEDICARE
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
## 2024-02-28 DIAGNOSIS — N39.0 URINARY TRACT INFECTION WITHOUT HEMATURIA, SITE UNSPECIFIED: ICD-10-CM

## 2024-02-28 DIAGNOSIS — S72.141A CLOSED DISPLACED INTERTROCHANTERIC FRACTURE OF RIGHT FEMUR, INITIAL ENCOUNTER (HCC): Primary | ICD-10-CM

## 2024-02-28 PROBLEM — S72.001A HIP FRACTURE REQUIRING OPERATIVE REPAIR, RIGHT, CLOSED, INITIAL ENCOUNTER (HCC): Status: ACTIVE | Noted: 2024-02-28

## 2024-02-28 PROBLEM — S72.002A HIP FRACTURE REQUIRING OPERATIVE REPAIR, LEFT, CLOSED, INITIAL ENCOUNTER (HCC): Status: RESOLVED | Noted: 2024-02-28 | Resolved: 2024-02-28

## 2024-02-28 PROBLEM — S72.002A HIP FRACTURE REQUIRING OPERATIVE REPAIR, LEFT, CLOSED, INITIAL ENCOUNTER (HCC): Status: ACTIVE | Noted: 2024-02-28

## 2024-02-28 LAB
ALBUMIN SERPL-MCNC: 3.5 G/DL (ref 3.4–5)
ALBUMIN/GLOB SERPL: 1 {RATIO} (ref 1.1–2.2)
ALP SERPL-CCNC: 49 U/L (ref 40–129)
ALT SERPL-CCNC: 9 U/L (ref 10–40)
ANION GAP SERPL CALCULATED.3IONS-SCNC: 9 MMOL/L (ref 3–16)
AST SERPL-CCNC: 15 U/L (ref 15–37)
BACTERIA URNS QL MICRO: ABNORMAL /HPF
BASOPHILS # BLD: 0 K/UL (ref 0–0.2)
BASOPHILS NFR BLD: 0.4 %
BILIRUB SERPL-MCNC: 0.7 MG/DL (ref 0–1)
BILIRUB UR QL STRIP.AUTO: NEGATIVE
BUN SERPL-MCNC: 14 MG/DL (ref 7–20)
CALCIUM SERPL-MCNC: 8.8 MG/DL (ref 8.3–10.6)
CHLORIDE SERPL-SCNC: 99 MMOL/L (ref 99–110)
CLARITY UR: ABNORMAL
CO2 SERPL-SCNC: 27 MMOL/L (ref 21–32)
COLOR UR: YELLOW
CREAT SERPL-MCNC: 0.7 MG/DL (ref 0.6–1.2)
DEPRECATED RDW RBC AUTO: 13 % (ref 12.4–15.4)
EKG ATRIAL RATE: 71 BPM
EKG DIAGNOSIS: NORMAL
EKG P AXIS: 64 DEGREES
EKG P-R INTERVAL: 168 MS
EKG Q-T INTERVAL: 394 MS
EKG QRS DURATION: 80 MS
EKG QTC CALCULATION (BAZETT): 428 MS
EKG R AXIS: 17 DEGREES
EKG T AXIS: 15 DEGREES
EKG VENTRICULAR RATE: 71 BPM
EOSINOPHIL # BLD: 0 K/UL (ref 0–0.6)
EOSINOPHIL NFR BLD: 0.2 %
GFR SERPLBLD CREATININE-BSD FMLA CKD-EPI: >60 ML/MIN/{1.73_M2}
GLUCOSE SERPL-MCNC: 143 MG/DL (ref 70–99)
GLUCOSE UR STRIP.AUTO-MCNC: NEGATIVE MG/DL
HCT VFR BLD AUTO: 33.5 % (ref 36–48)
HGB BLD-MCNC: 11.2 G/DL (ref 12–16)
HGB UR QL STRIP.AUTO: NEGATIVE
KETONES UR STRIP.AUTO-MCNC: NEGATIVE MG/DL
LEUKOCYTE ESTERASE UR QL STRIP.AUTO: ABNORMAL
LYMPHOCYTES # BLD: 0.8 K/UL (ref 1–5.1)
LYMPHOCYTES NFR BLD: 7.1 %
MCH RBC QN AUTO: 31 PG (ref 26–34)
MCHC RBC AUTO-ENTMCNC: 33.5 G/DL (ref 31–36)
MCV RBC AUTO: 92.4 FL (ref 80–100)
MONOCYTES # BLD: 0.7 K/UL (ref 0–1.3)
MONOCYTES NFR BLD: 6.9 %
NEUTROPHILS # BLD: 9.2 K/UL (ref 1.7–7.7)
NEUTROPHILS NFR BLD: 85.4 %
NITRITE UR QL STRIP.AUTO: POSITIVE
PH UR STRIP.AUTO: 7 [PH] (ref 5–8)
PLATELET # BLD AUTO: 289 K/UL (ref 135–450)
PMV BLD AUTO: 7.9 FL (ref 5–10.5)
POTASSIUM SERPL-SCNC: 4.3 MMOL/L (ref 3.5–5.1)
PROT SERPL-MCNC: 6.9 G/DL (ref 6.4–8.2)
PROT UR STRIP.AUTO-MCNC: ABNORMAL MG/DL
RBC # BLD AUTO: 3.62 M/UL (ref 4–5.2)
RBC #/AREA URNS HPF: ABNORMAL /HPF (ref 0–4)
SODIUM SERPL-SCNC: 135 MMOL/L (ref 136–145)
SP GR UR STRIP.AUTO: 1.02 (ref 1–1.03)
SPECIMEN STATUS: NORMAL
UA COMPLETE W REFLEX CULTURE PNL UR: ABNORMAL
UA DIPSTICK W REFLEX MICRO PNL UR: YES
URN SPEC COLLECT METH UR: ABNORMAL
UROBILINOGEN UR STRIP-ACNC: 0.2 E.U./DL
WBC # BLD AUTO: 10.8 K/UL (ref 4–11)
WBC #/AREA URNS HPF: ABNORMAL /HPF (ref 0–5)

## 2024-02-28 PROCEDURE — 93005 ELECTROCARDIOGRAM TRACING: CPT | Performed by: EMERGENCY MEDICINE

## 2024-02-28 PROCEDURE — 3600000004 HC SURGERY LEVEL 4 BASE: Performed by: STUDENT IN AN ORGANIZED HEALTH CARE EDUCATION/TRAINING PROGRAM

## 2024-02-28 PROCEDURE — 81001 URINALYSIS AUTO W/SCOPE: CPT

## 2024-02-28 PROCEDURE — 1200000000 HC SEMI PRIVATE

## 2024-02-28 PROCEDURE — C1713 ANCHOR/SCREW BN/BN,TIS/BN: HCPCS | Performed by: STUDENT IN AN ORGANIZED HEALTH CARE EDUCATION/TRAINING PROGRAM

## 2024-02-28 PROCEDURE — 2580000003 HC RX 258: Performed by: STUDENT IN AN ORGANIZED HEALTH CARE EDUCATION/TRAINING PROGRAM

## 2024-02-28 PROCEDURE — 99223 1ST HOSP IP/OBS HIGH 75: CPT | Performed by: STUDENT IN AN ORGANIZED HEALTH CARE EDUCATION/TRAINING PROGRAM

## 2024-02-28 PROCEDURE — 6360000002 HC RX W HCPCS: Performed by: STUDENT IN AN ORGANIZED HEALTH CARE EDUCATION/TRAINING PROGRAM

## 2024-02-28 PROCEDURE — 96375 TX/PRO/DX INJ NEW DRUG ADDON: CPT

## 2024-02-28 PROCEDURE — 2500000003 HC RX 250 WO HCPCS: Performed by: NURSE ANESTHETIST, CERTIFIED REGISTERED

## 2024-02-28 PROCEDURE — 3700000000 HC ANESTHESIA ATTENDED CARE: Performed by: STUDENT IN AN ORGANIZED HEALTH CARE EDUCATION/TRAINING PROGRAM

## 2024-02-28 PROCEDURE — 3700000001 HC ADD 15 MINUTES (ANESTHESIA): Performed by: STUDENT IN AN ORGANIZED HEALTH CARE EDUCATION/TRAINING PROGRAM

## 2024-02-28 PROCEDURE — 6360000002 HC RX W HCPCS: Performed by: EMERGENCY MEDICINE

## 2024-02-28 PROCEDURE — 71045 X-RAY EXAM CHEST 1 VIEW: CPT

## 2024-02-28 PROCEDURE — 96376 TX/PRO/DX INJ SAME DRUG ADON: CPT

## 2024-02-28 PROCEDURE — 70450 CT HEAD/BRAIN W/O DYE: CPT

## 2024-02-28 PROCEDURE — 2580000003 HC RX 258: Performed by: NURSE ANESTHETIST, CERTIFIED REGISTERED

## 2024-02-28 PROCEDURE — 7100000001 HC PACU RECOVERY - ADDTL 15 MIN: Performed by: STUDENT IN AN ORGANIZED HEALTH CARE EDUCATION/TRAINING PROGRAM

## 2024-02-28 PROCEDURE — 7100000000 HC PACU RECOVERY - FIRST 15 MIN: Performed by: STUDENT IN AN ORGANIZED HEALTH CARE EDUCATION/TRAINING PROGRAM

## 2024-02-28 PROCEDURE — 6360000002 HC RX W HCPCS: Performed by: NURSE ANESTHETIST, CERTIFIED REGISTERED

## 2024-02-28 PROCEDURE — 2709999900 HC NON-CHARGEABLE SUPPLY: Performed by: STUDENT IN AN ORGANIZED HEALTH CARE EDUCATION/TRAINING PROGRAM

## 2024-02-28 PROCEDURE — 73552 X-RAY EXAM OF FEMUR 2/>: CPT

## 2024-02-28 PROCEDURE — 2700000000 HC OXYGEN THERAPY PER DAY

## 2024-02-28 PROCEDURE — 73700 CT LOWER EXTREMITY W/O DYE: CPT

## 2024-02-28 PROCEDURE — 99285 EMERGENCY DEPT VISIT HI MDM: CPT

## 2024-02-28 PROCEDURE — 27245 TREAT THIGH FRACTURE: CPT | Performed by: STUDENT IN AN ORGANIZED HEALTH CARE EDUCATION/TRAINING PROGRAM

## 2024-02-28 PROCEDURE — 80053 COMPREHEN METABOLIC PANEL: CPT

## 2024-02-28 PROCEDURE — C1769 GUIDE WIRE: HCPCS | Performed by: STUDENT IN AN ORGANIZED HEALTH CARE EDUCATION/TRAINING PROGRAM

## 2024-02-28 PROCEDURE — 73502 X-RAY EXAM HIP UNI 2-3 VIEWS: CPT

## 2024-02-28 PROCEDURE — 85025 COMPLETE CBC W/AUTO DIFF WBC: CPT

## 2024-02-28 PROCEDURE — 93010 ELECTROCARDIOGRAM REPORT: CPT | Performed by: INTERNAL MEDICINE

## 2024-02-28 PROCEDURE — 2580000003 HC RX 258: Performed by: EMERGENCY MEDICINE

## 2024-02-28 PROCEDURE — 3600000014 HC SURGERY LEVEL 4 ADDTL 15MIN: Performed by: STUDENT IN AN ORGANIZED HEALTH CARE EDUCATION/TRAINING PROGRAM

## 2024-02-28 PROCEDURE — 27245 TREAT THIGH FRACTURE: CPT | Performed by: PHYSICIAN ASSISTANT

## 2024-02-28 PROCEDURE — 0QH806Z INSERTION OF INTRAMEDULLARY INTERNAL FIXATION DEVICE INTO RIGHT FEMORAL SHAFT, OPEN APPROACH: ICD-10-PCS | Performed by: STUDENT IN AN ORGANIZED HEALTH CARE EDUCATION/TRAINING PROGRAM

## 2024-02-28 PROCEDURE — 96365 THER/PROPH/DIAG IV INF INIT: CPT

## 2024-02-28 PROCEDURE — 6360000002 HC RX W HCPCS: Performed by: SPECIALIST/TECHNOLOGIST

## 2024-02-28 PROCEDURE — 2720000010 HC SURG SUPPLY STERILE: Performed by: STUDENT IN AN ORGANIZED HEALTH CARE EDUCATION/TRAINING PROGRAM

## 2024-02-28 PROCEDURE — 94761 N-INVAS EAR/PLS OXIMETRY MLT: CPT

## 2024-02-28 DEVICE — IMPLANTABLE DEVICE: Type: IMPLANTABLE DEVICE | Site: HIP | Status: FUNCTIONAL

## 2024-02-28 DEVICE — SCREW LK TI 5.0MM X 32MM: Type: IMPLANTABLE DEVICE | Site: HIP | Status: FUNCTIONAL

## 2024-02-28 RX ORDER — MORPHINE SULFATE 4 MG/ML
4 INJECTION, SOLUTION INTRAMUSCULAR; INTRAVENOUS
Status: COMPLETED | OUTPATIENT
Start: 2024-02-28 | End: 2024-02-28

## 2024-02-28 RX ORDER — POTASSIUM CHLORIDE 20 MEQ/1
40 TABLET, EXTENDED RELEASE ORAL PRN
Status: DISCONTINUED | OUTPATIENT
Start: 2024-02-28 | End: 2024-03-04 | Stop reason: HOSPADM

## 2024-02-28 RX ORDER — SODIUM CHLORIDE 0.9 % (FLUSH) 0.9 %
5-40 SYRINGE (ML) INJECTION PRN
Status: DISCONTINUED | OUTPATIENT
Start: 2024-02-28 | End: 2024-02-28 | Stop reason: HOSPADM

## 2024-02-28 RX ORDER — CEFAZOLIN SODIUM IN 0.9 % NACL 2 G/100 ML
2000 PLASTIC BAG, INJECTION (ML) INTRAVENOUS
Status: COMPLETED | OUTPATIENT
Start: 2024-02-28 | End: 2024-02-28

## 2024-02-28 RX ORDER — ONDANSETRON 2 MG/ML
4 INJECTION INTRAMUSCULAR; INTRAVENOUS EVERY 6 HOURS PRN
Status: DISCONTINUED | OUTPATIENT
Start: 2024-02-28 | End: 2024-03-04 | Stop reason: HOSPADM

## 2024-02-28 RX ORDER — BUPIVACAINE HYDROCHLORIDE 5 MG/ML
INJECTION, SOLUTION EPIDURAL; INTRACAUDAL PRN
Status: DISCONTINUED | OUTPATIENT
Start: 2024-02-28 | End: 2024-02-28 | Stop reason: ALTCHOICE

## 2024-02-28 RX ORDER — SODIUM CHLORIDE 0.9 % (FLUSH) 0.9 %
5-40 SYRINGE (ML) INJECTION EVERY 12 HOURS SCHEDULED
Status: DISCONTINUED | OUTPATIENT
Start: 2024-02-28 | End: 2024-02-28 | Stop reason: HOSPADM

## 2024-02-28 RX ORDER — KETAMINE HCL IN NACL, ISO-OSM 100MG/10ML
SYRINGE (ML) INJECTION PRN
Status: DISCONTINUED | OUTPATIENT
Start: 2024-02-28 | End: 2024-02-28 | Stop reason: SDUPTHER

## 2024-02-28 RX ORDER — POTASSIUM CHLORIDE 7.45 MG/ML
10 INJECTION INTRAVENOUS PRN
Status: DISCONTINUED | OUTPATIENT
Start: 2024-02-28 | End: 2024-03-04 | Stop reason: HOSPADM

## 2024-02-28 RX ORDER — DEXAMETHASONE SODIUM PHOSPHATE 4 MG/ML
INJECTION, SOLUTION INTRA-ARTICULAR; INTRALESIONAL; INTRAMUSCULAR; INTRAVENOUS; SOFT TISSUE PRN
Status: DISCONTINUED | OUTPATIENT
Start: 2024-02-28 | End: 2024-02-28 | Stop reason: SDUPTHER

## 2024-02-28 RX ORDER — ONDANSETRON 2 MG/ML
4 INJECTION INTRAMUSCULAR; INTRAVENOUS
Status: COMPLETED | OUTPATIENT
Start: 2024-02-28 | End: 2024-02-28

## 2024-02-28 RX ORDER — MORPHINE SULFATE 2 MG/ML
1 INJECTION, SOLUTION INTRAMUSCULAR; INTRAVENOUS EVERY 4 HOURS PRN
Status: DISCONTINUED | OUTPATIENT
Start: 2024-02-28 | End: 2024-03-01

## 2024-02-28 RX ORDER — ROCURONIUM BROMIDE 10 MG/ML
INJECTION, SOLUTION INTRAVENOUS PRN
Status: DISCONTINUED | OUTPATIENT
Start: 2024-02-28 | End: 2024-02-28 | Stop reason: SDUPTHER

## 2024-02-28 RX ORDER — OXYCODONE HYDROCHLORIDE AND ACETAMINOPHEN 5; 325 MG/1; MG/1
1 TABLET ORAL EVERY 6 HOURS PRN
Status: DISCONTINUED | OUTPATIENT
Start: 2024-02-28 | End: 2024-02-29

## 2024-02-28 RX ORDER — MAGNESIUM SULFATE IN WATER 40 MG/ML
2000 INJECTION, SOLUTION INTRAVENOUS PRN
Status: DISCONTINUED | OUTPATIENT
Start: 2024-02-28 | End: 2024-03-04 | Stop reason: HOSPADM

## 2024-02-28 RX ORDER — LIDOCAINE HYDROCHLORIDE 20 MG/ML
INJECTION, SOLUTION INFILTRATION; PERINEURAL PRN
Status: DISCONTINUED | OUTPATIENT
Start: 2024-02-28 | End: 2024-02-28 | Stop reason: SDUPTHER

## 2024-02-28 RX ORDER — ONDANSETRON 2 MG/ML
INJECTION INTRAMUSCULAR; INTRAVENOUS PRN
Status: DISCONTINUED | OUTPATIENT
Start: 2024-02-28 | End: 2024-02-28 | Stop reason: SDUPTHER

## 2024-02-28 RX ORDER — SODIUM CHLORIDE, SODIUM LACTATE, POTASSIUM CHLORIDE, CALCIUM CHLORIDE 600; 310; 30; 20 MG/100ML; MG/100ML; MG/100ML; MG/100ML
INJECTION, SOLUTION INTRAVENOUS CONTINUOUS PRN
Status: DISCONTINUED | OUTPATIENT
Start: 2024-02-28 | End: 2024-02-28 | Stop reason: SDUPTHER

## 2024-02-28 RX ORDER — OXYCODONE HYDROCHLORIDE 5 MG/1
5 TABLET ORAL PRN
Status: DISCONTINUED | OUTPATIENT
Start: 2024-02-28 | End: 2024-02-28 | Stop reason: HOSPADM

## 2024-02-28 RX ORDER — PROPOFOL 10 MG/ML
INJECTION, EMULSION INTRAVENOUS PRN
Status: DISCONTINUED | OUTPATIENT
Start: 2024-02-28 | End: 2024-02-28 | Stop reason: SDUPTHER

## 2024-02-28 RX ORDER — OXYCODONE HYDROCHLORIDE 5 MG/1
10 TABLET ORAL PRN
Status: DISCONTINUED | OUTPATIENT
Start: 2024-02-28 | End: 2024-02-28 | Stop reason: HOSPADM

## 2024-02-28 RX ORDER — MORPHINE SULFATE 2 MG/ML
2 INJECTION, SOLUTION INTRAMUSCULAR; INTRAVENOUS EVERY 4 HOURS PRN
Status: DISCONTINUED | OUTPATIENT
Start: 2024-02-28 | End: 2024-02-28 | Stop reason: SDUPTHER

## 2024-02-28 RX ORDER — ONDANSETRON 2 MG/ML
4 INJECTION INTRAMUSCULAR; INTRAVENOUS EVERY 30 MIN PRN
Status: DISCONTINUED | OUTPATIENT
Start: 2024-02-28 | End: 2024-02-28 | Stop reason: HOSPADM

## 2024-02-28 RX ORDER — SODIUM CHLORIDE 9 MG/ML
INJECTION, SOLUTION INTRAVENOUS PRN
Status: DISCONTINUED | OUTPATIENT
Start: 2024-02-28 | End: 2024-03-04 | Stop reason: HOSPADM

## 2024-02-28 RX ORDER — SODIUM CHLORIDE 9 MG/ML
INJECTION, SOLUTION INTRAVENOUS PRN
Status: DISCONTINUED | OUTPATIENT
Start: 2024-02-28 | End: 2024-02-28 | Stop reason: HOSPADM

## 2024-02-28 RX ORDER — SODIUM CHLORIDE 0.9 % (FLUSH) 0.9 %
5-40 SYRINGE (ML) INJECTION EVERY 12 HOURS SCHEDULED
Status: DISCONTINUED | OUTPATIENT
Start: 2024-02-28 | End: 2024-03-04 | Stop reason: HOSPADM

## 2024-02-28 RX ORDER — POLYETHYLENE GLYCOL 3350 17 G/17G
17 POWDER, FOR SOLUTION ORAL DAILY PRN
Status: DISCONTINUED | OUTPATIENT
Start: 2024-02-28 | End: 2024-03-01

## 2024-02-28 RX ORDER — SODIUM CHLORIDE 0.9 % (FLUSH) 0.9 %
5-40 SYRINGE (ML) INJECTION PRN
Status: DISCONTINUED | OUTPATIENT
Start: 2024-02-28 | End: 2024-03-04 | Stop reason: HOSPADM

## 2024-02-28 RX ORDER — PHENYLEPHRINE HCL IN 0.9% NACL 1 MG/10 ML
SYRINGE (ML) INTRAVENOUS PRN
Status: DISCONTINUED | OUTPATIENT
Start: 2024-02-28 | End: 2024-02-28 | Stop reason: SDUPTHER

## 2024-02-28 RX ORDER — SODIUM CHLORIDE 9 MG/ML
1000 INJECTION, SOLUTION INTRAVENOUS CONTINUOUS
Status: DISCONTINUED | OUTPATIENT
Start: 2024-02-28 | End: 2024-02-29

## 2024-02-28 RX ORDER — CEFAZOLIN SODIUM IN 0.9 % NACL 2 G/100 ML
2000 PLASTIC BAG, INJECTION (ML) INTRAVENOUS EVERY 8 HOURS
Status: COMPLETED | OUTPATIENT
Start: 2024-02-29 | End: 2024-02-29

## 2024-02-28 RX ORDER — LABETALOL HYDROCHLORIDE 5 MG/ML
10 INJECTION, SOLUTION INTRAVENOUS
Status: DISCONTINUED | OUTPATIENT
Start: 2024-02-28 | End: 2024-02-28 | Stop reason: HOSPADM

## 2024-02-28 RX ORDER — ONDANSETRON 4 MG/1
4 TABLET, ORALLY DISINTEGRATING ORAL EVERY 8 HOURS PRN
Status: DISCONTINUED | OUTPATIENT
Start: 2024-02-28 | End: 2024-03-04 | Stop reason: HOSPADM

## 2024-02-28 RX ADMIN — CEFTRIAXONE SODIUM 1000 MG: 1 INJECTION, POWDER, FOR SOLUTION INTRAMUSCULAR; INTRAVENOUS at 14:03

## 2024-02-28 RX ADMIN — Medication 2000 MG: at 16:03

## 2024-02-28 RX ADMIN — PROPOFOL 200 MG: 10 INJECTION, EMULSION INTRAVENOUS at 15:58

## 2024-02-28 RX ADMIN — METHOCARBAMOL 500 MG: 100 INJECTION INTRAMUSCULAR; INTRAVENOUS at 15:48

## 2024-02-28 RX ADMIN — ONDANSETRON 4 MG: 2 INJECTION INTRAMUSCULAR; INTRAVENOUS at 07:09

## 2024-02-28 RX ADMIN — Medication 10 ML: at 19:41

## 2024-02-28 RX ADMIN — ONDANSETRON 4 MG: 2 INJECTION INTRAMUSCULAR; INTRAVENOUS at 13:37

## 2024-02-28 RX ADMIN — LIDOCAINE HYDROCHLORIDE 60 MG: 20 INJECTION, SOLUTION INFILTRATION; PERINEURAL at 15:58

## 2024-02-28 RX ADMIN — DEXAMETHASONE SODIUM PHOSPHATE 4 MG: 4 INJECTION, SOLUTION INTRAMUSCULAR; INTRAVENOUS at 16:05

## 2024-02-28 RX ADMIN — SUGAMMADEX 200 MG: 100 INJECTION, SOLUTION INTRAVENOUS at 16:33

## 2024-02-28 RX ADMIN — ONDANSETRON 4 MG: 2 INJECTION INTRAMUSCULAR; INTRAVENOUS at 09:19

## 2024-02-28 RX ADMIN — ROCURONIUM BROMIDE 35 MG: 50 INJECTION, SOLUTION INTRAVENOUS at 15:58

## 2024-02-28 RX ADMIN — ONDANSETRON 4 MG: 2 INJECTION INTRAMUSCULAR; INTRAVENOUS at 16:45

## 2024-02-28 RX ADMIN — Medication 200 MCG: at 16:11

## 2024-02-28 RX ADMIN — SODIUM CHLORIDE, SODIUM LACTATE, POTASSIUM CHLORIDE, AND CALCIUM CHLORIDE: .6; .31; .03; .02 INJECTION, SOLUTION INTRAVENOUS at 15:48

## 2024-02-28 RX ADMIN — SODIUM CHLORIDE 1000 ML: 9 INJECTION, SOLUTION INTRAVENOUS at 08:18

## 2024-02-28 RX ADMIN — MORPHINE SULFATE 4 MG: 4 INJECTION, SOLUTION INTRAMUSCULAR; INTRAVENOUS at 07:09

## 2024-02-28 RX ADMIN — MORPHINE SULFATE 4 MG: 4 INJECTION, SOLUTION INTRAMUSCULAR; INTRAVENOUS at 13:37

## 2024-02-28 RX ADMIN — SODIUM CHLORIDE 1000 ML: 9 INJECTION, SOLUTION INTRAVENOUS at 19:41

## 2024-02-28 RX ADMIN — MORPHINE SULFATE 4 MG: 4 INJECTION, SOLUTION INTRAMUSCULAR; INTRAVENOUS at 09:19

## 2024-02-28 RX ADMIN — Medication 20 MG: at 16:05

## 2024-02-28 ASSESSMENT — PAIN SCALES - PAIN ASSESSMENT IN ADVANCED DEMENTIA (PAINAD)
BREATHING: 0
FACIALEXPRESSION: SMILING OR INEXPRESSIVE
BODYLANGUAGE: 0
BREATHING: 0
CONSOLABILITY: 0
NEGVOCALIZATION: 0
BODYLANGUAGE: 0
FACIALEXPRESSION: SMILING OR INEXPRESSIVE
BODYLANGUAGE: RELAXED
TOTALSCORE: 0
BODYLANGUAGE: RELAXED
TOTALSCORE: 0
FACIALEXPRESSION: 0
TOTALSCORE: 0
BODYLANGUAGE: RELAXED
CONSOLABILITY: NO NEED TO CONSOLE
BODYLANGUAGE: 0
CONSOLABILITY: NO NEED TO CONSOLE
FACIALEXPRESSION: SMILING OR INEXPRESSIVE
NEGVOCALIZATION: 0
BODYLANGUAGE: RELAXED
FACIALEXPRESSION: 0
BODYLANGUAGE: 0
CONSOLABILITY: NO NEED TO CONSOLE
BODYLANGUAGE: RELAXED
BREATHING: 0
BREATHING: 0
CONSOLABILITY: NO NEED TO CONSOLE
FACIALEXPRESSION: SMILING OR INEXPRESSIVE
CONSOLABILITY: NO NEED TO CONSOLE
NEGVOCALIZATION: 0
BODYLANGUAGE: 0
CONSOLABILITY: 0
CONSOLABILITY: 0
BREATHING: 0
TOTALSCORE: 0
CONSOLABILITY: 0
NEGVOCALIZATION: 0
BREATHING: NORMAL
CONSOLABILITY: 0
TOTALSCORE: 0
NEGVOCALIZATION: 0
BREATHING: NORMAL
FACIALEXPRESSION: SMILING OR INEXPRESSIVE
BREATHING: NORMAL
FACIALEXPRESSION: 0
BREATHING: NORMAL
FACIALEXPRESSION: 0
BREATHING: NORMAL

## 2024-02-28 ASSESSMENT — PAIN DESCRIPTION - LOCATION: LOCATION: HIP

## 2024-02-28 ASSESSMENT — PAIN SCALES - GENERAL
PAINLEVEL_OUTOF10: 6
PAINLEVEL_OUTOF10: 0
PAINLEVEL_OUTOF10: 0
PAINLEVEL_OUTOF10: 9
PAINLEVEL_OUTOF10: 0
PAINLEVEL_OUTOF10: 0
PAINLEVEL_OUTOF10: 9
PAINLEVEL_OUTOF10: 0

## 2024-02-28 ASSESSMENT — PAIN DESCRIPTION - ORIENTATION: ORIENTATION: RIGHT

## 2024-02-28 ASSESSMENT — PAIN - FUNCTIONAL ASSESSMENT: PAIN_FUNCTIONAL_ASSESSMENT: 0-10

## 2024-02-28 NOTE — ED NOTES
Pt left ED via stretcher in stable condition at this time with [facility] staff. All belongings sent with pt and pts Daughter, Juan A. Care transferred.

## 2024-02-28 NOTE — DISCHARGE INSTRUCTIONS
Keep dressing clean and dry.  Change Mepilex every 3-5 days or as needed for excess drainage.  Please call physician if increased redness around incision, increased drainage, fevers, or pain becomes significantly worse.  Do not immerse in water such as baths but okay to shower with dressing on to protect wound.    F/U with Dr Sanches in 10-14 days.  Call [facility] for appointment time and date for follow up at [phone].        Weight Bearing on Surgical Side: non weightbearing to the right leg    Continue DVT Prophylaxis: Lovenox 40mg subcut daily for 42 days post op    Thigh high compression stockings to be worn on both legs for 30 days post-op. Put the compression stockings on in the morning, take off before you go to bed    Pain Medications  You were given oxycodone (Oxycontin, Oxyir)  Wean off pain medications as you deem appropriate as long as pain is under control  Be sure to drink plenty of fluids (recommend water) while taking narcotic pain medications to prevent constipation   You may take an over the counter laxative or stool softener as needed to prevent/treat constipation as well, we recommend miralax/glycolax.  We recommend that you consider taking these medications the entire time you are taking pain medication.  Cold packs/Ice packs/Machine  May be used as much as necessary to control swelling/inflammation/soreness  Be sure to have a barrier (cloth, clothing, towel) between the site and the ice pack to prevent frostbite  Contact [facility] office [phone] if  Increased redness, swelling, drainage of any kind, and/or pain to surgery site.  As well as new onset fevers and or chills.  These could signify an infection.  Calf or thigh tenderness to touch as well as increased swelling or redness.  This could signify a clot formation.  Numbness or tingling to an area around the incision site or below the incision site (toes).  Any rash appears, increased  or new onset

## 2024-02-28 NOTE — PLAN OF CARE
Orthopedic Plan of Care    Received a call regarding the patient's R hip IT fracture after a fall at a nursing home. Patient also found to have a small subdural hematoma from the fall.    Plan  - Patient last ate yesterday. Maintain NPO status.  - IM admission and risk stratification greatly appreciated  - Neurosurgery recommends repeat head CT at 1345 to monitor stability of the subdural hematoma. If worsening, transfer to [facility] recommended per their team.  - If head CT stable and no major medical contra-indications from IM team, will plan for R hip nailing this afternoon around 330-4pm.  - Full consult to follow    Wilman Sanches DO

## 2024-02-28 NOTE — ANESTHESIA PRE PROCEDURE
Department of Anesthesiology  Preprocedure Note       Name:  Sonia Benavides   Age:  88 y.o.  :  1935                                          MRN:  [id number]         Date:  2024      Surgeon: Surgeon(s):  Wilman Sanches DO    Procedure: Procedure(s):  RIGHT FEMUR INTRAMEDULLARY NAIL VERN INSERTION ANTEGRADE    Medications prior to admission:   Prior to Admission medications    Medication Sig Start Date End Date Taking? Authorizing Provider   donepezil (ARICEPT) 10 MG tablet Take 1 tablet by mouth daily 24   Ziyad Dumont MD   melatonin 5 MG TBDP disintegrating tablet Take 1 tablet by mouth every evening 2/13/24 3/14/24  Ziyad Dumont MD   memantine (NAMENDA) 5 MG tablet Take 1 tablet by mouth 2 times daily 24   Ziyad Dumont MD   vitamin B-12 1000 MCG tablet Take 1 tablet by mouth daily 24   Ziyad Dumont MD       Current medications:    Current Facility-Administered Medications   Medication Dose Route Frequency Provider Last Rate Last Admin   • 0.9 % sodium chloride infusion  1,000 mL IntraVENous Continuous Slick Barajas  mL/hr at 24 0818 1,000 mL at 24 0818   • ceFAZolin (ANCEF) 2000 mg in 0.9% sodium chloride 100 mL IVPB  2,000 mg IntraVENous On Call to OR Marilee Cates PA       • sodium chloride flush 0.9 % injection 5-40 mL  5-40 mL IntraVENous 2 times per day Venkat Cortez N, DO       • sodium chloride flush 0.9 % injection 5-40 mL  5-40 mL IntraVENous PRN Venkat Cortez N, DO       • 0.9 % sodium chloride infusion   IntraVENous PRN Venkat Cortez N, DO       • potassium chloride (KLOR-CON M) extended release tablet 40 mEq  40 mEq Oral PRN Venkat Cortez, DO        Or   • potassium bicarb-citric acid (EFFER-K) effervescent tablet 40 mEq  40 mEq Oral PRN Venkat Cortez N, DO        Or   • potassium chloride 10 mEq/100 mL IVPB (Peripheral Line)  10 mEq IntraVENous PRN Venkat Cortez N, DO       • magnesium sulfate 2000

## 2024-02-28 NOTE — H&P
H&P Update    Patient's History and Physical from February 28, 2024 was reviewed.    Patient examined.    There has been no change, plan for right hip nail.    Wilman Sanches,   3:22 PM 2/28/2024    
periventricular and subcortical white matter, which likely represent chronic microvascular ischemic change. ORBITS: The visualized portion of the orbits demonstrate no acute abnormality. SINUSES: The visualized paranasal sinuses and mastoid air cells demonstrate no acute abnormality. SOFT TISSUES/SKULL: No acute abnormality of the visualized skull or soft tissues.     Small 2 mm posterior parafalcine subdural hematoma. Findings were discussed with BLAIRE SIDHU at 7:52 am on 2/28/2024.     XR HIP 2-3 VW W PELVIS RIGHT    Result Date: 2/28/2024  EXAMINATION: ONE XRAY VIEW OF THE PELVIS AND TWO XRAY VIEWS RIGHT HIP 2/28/2024 7:02 am COMPARISON: None. HISTORY: ORDERING SYSTEM PROVIDED HISTORY: fall TECHNOLOGIST PROVIDED HISTORY: Reason for exam:->fall Reason for Exam: fall, right hip pain FINDINGS: There is a comminuted fracture extending through the intratrochanteric region of the right hip.  There is moderate apex lateral angulation.  Right hip joint space is relatively well maintained.  The soft tissues are unremarkable.     Intratrochanteric right hip fracture     XR CHEST PORTABLE    Result Date: 2/28/2024  EXAMINATION: ONE XRAY VIEW OF THE CHEST 2/28/2024 7:03 am COMPARISON: 01/29/2024 HISTORY: ORDERING SYSTEM PROVIDED HISTORY: pre op TECHNOLOGIST PROVIDED HISTORY: Reason for exam:->pre op Reason for Exam: pre op FINDINGS: The lungs are without acute focal process.  There is no effusion or pneumothorax. The cardiomediastinal silhouette is stable. The osseous structures are stable.     No acute process.       PCP: No primary care provider on file.    Past Medical History:        Diagnosis Date    Skin cancer        Past Surgical History:    History reviewed. No pertinent surgical history.    Medications Prior to Admission:   Prior to Admission medications    Medication Sig Start Date End Date Taking? Authorizing Provider   donepezil (ARICEPT) 10 MG tablet Take 1 tablet by mouth daily 2/14/24   Julia

## 2024-02-28 NOTE — ED PROVIDER NOTES
REFERRED TO:  No follow-up provider specified.    DISCHARGE MEDICATIONS:  New Prescriptions    No medications on file       DISCONTINUED MEDICATIONS:  Discontinued Medications    No medications on file              (Please note that portions of this note were completed with a voice recognition program.  Efforts were made to edit the dictations but occasionally words are mis-transcribed.)    Slick Barajas MD (electronically signed)          This chart was generated using the Dragon dictation system.  I created this record but it may contain dictation errors.          Slick Barajas MD  02/28/24 7208

## 2024-02-28 NOTE — BRIEF OP NOTE
Brief Postoperative Note      Patient: Sonia Benavides  YOB: 1935  MRN: [id number]    Date of Procedure: 2/28/2024    Pre-Op Diagnosis Codes:     * Closed fracture of right hip, initial encounter ([facility]) [S72.001A]    Post-Op Diagnosis:  Right hip intertrochanteric femur fracture       Procedure(s):  RIGHT FEMUR INTRAMEDULLARY NAIL FREDY INSERTION ANTEGRADE    Surgeon(s):  Wilman Sanches DO    Assistant:  Surgical Assistant: Wilman Lawler PA-C    Anesthesia: General    Estimated Blood Loss (mL): less than 50     Complications: None    Specimens:   * No specimens in log *    Implants:  Implant Name Type Inv. Item Serial No.  Lot No. LRB No. Used Action   NAIL IM L170MM [id number] 130DEG SHT FEM GRN TI LALIT TEVIN - [id number]  NAIL IM L170MM [id number] 130DEG SHT FEM GRN TI LALIT TEVIN  DEPUY SYNTHES [facility]-WD [id number] Right 1 Implanted   BLADE IM L80MM DIA10.35MM PROX FEM G TI LALIT FEN MARYJANE FOR - [id number]  BLADE IM L80MM DIA10.35MM PROX FEM G TI LALIT FEN MARYJANE FOR  DEPUY SYNTHES [employer]-WD [id number] Right 1 Implanted   SCREW LK TI 5.0MM X 32MM - [id number] Screw/Plate/Nail/Fredy SCREW LK TI 5.0MM X 32MM  SYNTHES-PMM [id number] Right 1 Implanted   Implants: Synthes short TFNA 11x130, 80 helical blade, 32 screw      Drains:   [REMOVED] External Urinary Catheter (Removed)   Site Assessment Clean,dry & intact 02/28/24 0819   Placement Initiated 02/28/24 0819   Perineal Care Yes 02/28/24 0819   Suction 40 mmgHg continuous 02/28/24 0819       Findings: see op note      Electronically signed by Wilman Sanches DO on 2/28/2024 at 4:41 PM

## 2024-02-28 NOTE — ED NOTES
Pts SpO2 88% on room air after receiving IVP Morphine. Placed pt on 2LO2 NC. SpO2 now 99%. Notified Dr. Barajas.

## 2024-02-28 NOTE — ED NOTES
Perfect Serve to Dr. Cortez: Repeat CT is back. Dr. Barajas left for the day, but stated to page you once the result came back if it was still stable/the same. From what I read, CT stable. Please review and place orders and let me know if I can call Same Day Surgery to come get pt. They have been waiting for the results to take the pt to surgery.  Pt has also been dx'd with UTI while down here and given Rocephin. Thank you.    Addendum 1446: Per Dr. Diego alaniz for pt to go to surgery. SDS aware.

## 2024-02-28 NOTE — CARE COORDINATION
Case Management Assessment  Initial Evaluation    Date/Time of Evaluation: 2/28/2024 11:48 AM  Assessment Completed by: VIOLET Siegel    If patient is discharged prior to next notation, then this note serves as note for discharge by case management.    Patient Name: Sonia Benavides                   YOB: 1935  Diagnosis: No admission diagnoses are documented for this encounter.                   Date / Time: 2/28/2024  6:57 AM    Patient Admission Status: Emergency   Readmission Risk (Low < 19, Mod (19-27), High > 27): Readmission Risk Score: 6.4    Current PCP: No primary care provider on file.  PCP verified by CM? (P) Yes    Chart Reviewed: Yes      History Provided by: (P) Child/Family  Patient Orientation: (P) Person    Patient Cognition: (P) Dementia / Early Alzheimer's    Hospitalization in the last 30 days (Readmission):  Yes    If yes, Readmission Assessment in CM Navigator will be completed.    Advance Directives:      Code Status: Prior   Patient's Primary Decision Maker is: (P) Legal Next of Kin    Primary Decision Maker: Juan A Benavides - Child - [phone]    Discharge Planning:    Patient lives with: (P) Other (Comment) Type of Home: (P) Assisted living  Primary Care Giver: (P) Other (Comment) (Assisted Living at [facility])  Patient Support Systems include: (P) Children, Family Members   Current Financial resources: (P) None  Current community resources: (P) ECF/Home Care  Current services prior to admission: (P) Other (Comment) (AL)            Current DME:              Type of Home Care services:  (P) None    ADLS  Prior functional level: (P) Assistance with the following:, Bathing, Dressing, Cooking, Housework, Shopping  Current functional level: (P) Assistance with the following:, Bathing, Dressing, Toileting, Cooking, Housework, Shopping, Mobility    PT AM-PAC:   /24  OT AM-PAC:   /24    Family can provide assistance at DC: (P) Yes  Would you like Case Management to

## 2024-02-28 NOTE — ED NOTES
Called [facility] Access Line @3217.  Per; Slick Barajas MD.  Re; parafalcine subdural hematoma  Devin Kitchen NP called @2092.

## 2024-02-28 NOTE — PLAN OF CARE
Department of Orthopedic Surgery  Physician Assistant   Pre- Rounding Consult Note/Plan of Care Note      Reason for Consult: Intertrochanteric femur fracture  Date of Service: 2/28/2024 9:44 AM    HISTORY OF PRESENT ILLNESS:                The patient is a 88 y.o. female with dementia who presents with above chief complaint.  Patient unable to provide history, patient's daughter at bedside.  [facility] to locked dementia unit due to wandering behaviors.  Patient's daughter was not given much detail on the fall, received a call this morning from the [facility] stating patient fell and was being transported to [facility].  In the emergency department she was found to have a right femur intertrochanteric fracture and orthopedics was consulted.  The patient was living independently earlier this year before she was moved to a memory care unit by her family.  Patient does not use an assistive device at baseline, but does wall/chair walk due to unsteady gait.  Patient's daughter states that the facility has told her the patient lost her balance standing up earlier in the week and fell down hard onto the chair, no injuries at that time.  Patient's daughter reports that patient had likely been suffering from frequent falls lately and had wrist fractures and rib fractures in the past.      Past Medical History:        Diagnosis Date    Skin cancer      Past Surgical History:    History reviewed. No pertinent surgical history.      Medications Prior to Admission:   Prior to Admission medications    Medication Sig Start Date End Date Taking? Authorizing Provider   donepezil (ARICEPT) 10 MG tablet Take 1 tablet by mouth daily 2/14/24   Ziyad Dumont MD   melatonin 5 MG TBDP disintegrating tablet Take 1 tablet by mouth every evening 2/13/24 3/14/24  Ziyad Dumont MD   memantine (NAMENDA) 5 MG

## 2024-02-28 NOTE — CONSULTS
Consultant: Wilman Sanches DO  From: Dr. Barajas  Reason: R hip fracture    Chief Complaint   Patient presents with    Fall    Hip Injury     Per EMS report pt fell sometime this morning. Upon their arrival they report pt was back in her bed. Pt c/o right hip pain which appears to be shortened and rotated.         History of Present Illness      Sonia Benavides is a 88 y.o. female who presents with RIGHT hip pain. Patient sustained a mechanical fall resulting in the injury. She currently resides in a memory home due to worsening dementia. She sustained a displaced IT fracture. Found to have a small but stable subdural hematoma.     Baseline functional level: Home ambulator, likely needs a walker        Past History       Past Medical History:   Diagnosis Date    Skin cancer      History reviewed. No pertinent surgical history.  Family History   Problem Relation Age of Onset    Heart Disease Mother      Social History     Tobacco Use    Smoking status: Never    Smokeless tobacco: Never   Substance Use Topics    Alcohol use: Not Currently      No current facility-administered medications on file prior to encounter.     Current Outpatient Medications on File Prior to Encounter   Medication Sig Dispense Refill    donepezil (ARICEPT) 10 MG tablet Take 1 tablet by mouth daily 30 tablet 0    melatonin 5 MG TBDP disintegrating tablet Take 1 tablet by mouth every evening 30 tablet 0    memantine (NAMENDA) 5 MG tablet Take 1 tablet by mouth 2 times daily 60 tablet 0    vitamin B-12 1000 MCG tablet Take 1 tablet by mouth daily 30 tablet 0      Patient has no known allergies.    Review of Systems      10-point ROS is negative other than what's mentioned in HPI.    Physical Exam    Based off 1997 Exam Criteria    BP (!) 129/49   Pulse 64   Temp 98.8 °F (37.1 °C) (Oral)   Resp 16   Ht 1.524 m (5')   Wt 65.8 kg (145 lb)   SpO2 97%   BMI 28.32 kg/m²      Constitutional:       General: Patient is not in acute distress.

## 2024-02-28 NOTE — PLAN OF CARE
NEUROSURGERY PLAN OF CARE NOTE    JENNIFER TONG  [id number]   1935 2/28/2024      Radiological Findings:  CT Head W/O Contrast  Result Date: 2/28/2024  Small 2 mm posterior parafalcine subdural hematoma. Findings were discussed with BLAIRE SIDHU at 7:52 am on 2/28/2024.     XR HIP 2-3 VW W PELVIS RIGHT  Result Date: 2/28/2024  Intratrochanteric right hip fracture     Assessment:  Patient is a 88 y.o. y/o female w/broken hip and tiny posterior parafalcine subdural hematoma s/p fall.    Recommendations:  Neurologic exams frequency:  - Floor: Q4H  For change in exam MUST contact neurosurgery team  CT Head w/o contrast 6 hours from previous scan. If scan stable, then OK to stay at [facility], and no contraindication to doing surgical intervention for fractured hip. If hemorrhage worsens, then OK to transfer to [facility] for further management.  Hold all anticoagulation & antiplatelet for 2 weeks. If 81 mg ASA indicated after surgery, then recommend repeating CT Head 24 hours after first dose to confirm no change in subdural hemorrhage  DVT Prophylaxis: SCD's & OK to start chemoprophylaxis 24 hours from stable CT Head scan       Electronically signed by: HÉCTOR Burns CNP, APRN-CNP, 2/28/2024 8:11 AM  [phone]

## 2024-02-28 NOTE — ANESTHESIA POSTPROCEDURE EVALUATION
Department of Anesthesiology  Postprocedure Note    Patient: Sonia Benavides  MRN: [id number]  YOB: 1935  Date of evaluation: 2/28/2024    Procedure Summary       Date: 02/28/24 Room / Location: [address]    Anesthesia Start: 1548 Anesthesia Stop: 1706    Procedure: RIGHT FEMUR INTRAMEDULLARY NAIL VERN INSERTION ANTEGRADE (Right: Hip) Diagnosis:       Closed fracture of right hip, initial encounter (formerly [facility])      (Closed fracture of right hip, initial encounter (formerly [facility]) [S72.001A])    Surgeons: Wilman Sanches DO Responsible Provider: Isai Hummel MD    Anesthesia Type: General ASA Status: 3            Anesthesia Type: General    Reno Phase I: Reno Score: 9    Reno Phase II:      Anesthesia Post Evaluation    Patient location during evaluation: PACU  Patient participation: complete - patient participated  Level of consciousness: awake  Airway patency: patent  Nausea & Vomiting: no vomiting  Cardiovascular status: blood pressure returned to baseline  Respiratory status: acceptable  Hydration status: stable  Pain management: adequate    No notable events documented.

## 2024-02-28 NOTE — ACP (ADVANCE CARE PLANNING)
Advance Care Planning     General Advance Care Planning (ACP) Conversation    Date of Conversation: 2/28/2024  Conducted with: Patient with Decision Making Capacity    Healthcare Decision Maker:    Primary Decision Maker: Juan A Benavides - Ashwin - [phone]  Click here to complete Healthcare Decision Makers including selection of the Healthcare Decision Maker Relationship (ie \"Primary\").   Today we documented Decision Maker(s) consistent with Legal Next of Kin hierarchy.    Content/Action Overview:  Has ACP document(s) on file - reflects the patient's care preferences          Length of Voluntary ACP Conversation in minutes:  <16 minutes (Non-Billable)    VIOLET Siegel

## 2024-02-29 ENCOUNTER — APPOINTMENT (OUTPATIENT)
Dept: GENERAL RADIOLOGY | Age: 89
DRG: 956 | End: 2024-02-29
Payer: MEDICARE

## 2024-02-29 ENCOUNTER — ANESTHESIA EVENT (OUTPATIENT)
Dept: OPERATING ROOM | Age: 89
End: 2024-02-29
Payer: MEDICARE

## 2024-02-29 ENCOUNTER — ANESTHESIA (OUTPATIENT)
Dept: OPERATING ROOM | Age: 89
End: 2024-02-29
Payer: MEDICARE

## 2024-02-29 PROBLEM — S72.341A CLOSED DISPLACED SPIRAL FRACTURE OF SHAFT OF RIGHT FEMUR (HCC): Status: ACTIVE | Noted: 2024-02-29

## 2024-02-29 PROBLEM — M97.8XXA PERI-PROSTHETIC FRACTURE OF FEMUR AT TIP OF PROSTHESIS: Status: ACTIVE | Noted: 2024-02-29

## 2024-02-29 PROBLEM — Z96.649 PERI-PROSTHETIC FRACTURE OF FEMUR AT TIP OF PROSTHESIS: Status: ACTIVE | Noted: 2024-02-29

## 2024-02-29 LAB
ANION GAP SERPL CALCULATED.3IONS-SCNC: 10 MMOL/L (ref 3–16)
BUN SERPL-MCNC: 13 MG/DL (ref 7–20)
CALCIUM SERPL-MCNC: 8.2 MG/DL (ref 8.3–10.6)
CHLORIDE SERPL-SCNC: 102 MMOL/L (ref 99–110)
CO2 SERPL-SCNC: 25 MMOL/L (ref 21–32)
CREAT SERPL-MCNC: 0.7 MG/DL (ref 0.6–1.2)
DEPRECATED RDW RBC AUTO: 13.3 % (ref 12.4–15.4)
GFR SERPLBLD CREATININE-BSD FMLA CKD-EPI: >60 ML/MIN/{1.73_M2}
GLUCOSE SERPL-MCNC: 106 MG/DL (ref 70–99)
HCT VFR BLD AUTO: 32 % (ref 36–48)
HGB BLD-MCNC: 10.5 G/DL (ref 12–16)
MCH RBC QN AUTO: 30.5 PG (ref 26–34)
MCHC RBC AUTO-ENTMCNC: 32.8 G/DL (ref 31–36)
MCV RBC AUTO: 93 FL (ref 80–100)
PLATELET # BLD AUTO: 277 K/UL (ref 135–450)
PMV BLD AUTO: 7.9 FL (ref 5–10.5)
POTASSIUM SERPL-SCNC: 3.8 MMOL/L (ref 3.5–5.1)
RBC # BLD AUTO: 3.44 M/UL (ref 4–5.2)
SODIUM SERPL-SCNC: 137 MMOL/L (ref 136–145)
WBC # BLD AUTO: 8.2 K/UL (ref 4–11)

## 2024-02-29 PROCEDURE — C1713 ANCHOR/SCREW BN/BN,TIS/BN: HCPCS | Performed by: ORTHOPAEDIC SURGERY

## 2024-02-29 PROCEDURE — 1200000000 HC SEMI PRIVATE

## 2024-02-29 PROCEDURE — 3700000001 HC ADD 15 MINUTES (ANESTHESIA): Performed by: ORTHOPAEDIC SURGERY

## 2024-02-29 PROCEDURE — 6360000002 HC RX W HCPCS: Performed by: SPECIALIST/TECHNOLOGIST

## 2024-02-29 PROCEDURE — 99024 POSTOP FOLLOW-UP VISIT: CPT | Performed by: SPECIALIST/TECHNOLOGIST

## 2024-02-29 PROCEDURE — 2709999900 HC NON-CHARGEABLE SUPPLY: Performed by: ORTHOPAEDIC SURGERY

## 2024-02-29 PROCEDURE — 2580000003 HC RX 258: Performed by: STUDENT IN AN ORGANIZED HEALTH CARE EDUCATION/TRAINING PROGRAM

## 2024-02-29 PROCEDURE — 80048 BASIC METABOLIC PNL TOTAL CA: CPT

## 2024-02-29 PROCEDURE — 2580000003 HC RX 258: Performed by: ORTHOPAEDIC SURGERY

## 2024-02-29 PROCEDURE — 3700000000 HC ANESTHESIA ATTENDED CARE: Performed by: ORTHOPAEDIC SURGERY

## 2024-02-29 PROCEDURE — 7100000001 HC PACU RECOVERY - ADDTL 15 MIN: Performed by: ORTHOPAEDIC SURGERY

## 2024-02-29 PROCEDURE — 94761 N-INVAS EAR/PLS OXIMETRY MLT: CPT

## 2024-02-29 PROCEDURE — 6370000000 HC RX 637 (ALT 250 FOR IP): Performed by: SPECIALIST/TECHNOLOGIST

## 2024-02-29 PROCEDURE — 6360000002 HC RX W HCPCS: Performed by: STUDENT IN AN ORGANIZED HEALTH CARE EDUCATION/TRAINING PROGRAM

## 2024-02-29 PROCEDURE — 3600000014 HC SURGERY LEVEL 4 ADDTL 15MIN: Performed by: ORTHOPAEDIC SURGERY

## 2024-02-29 PROCEDURE — 36415 COLL VENOUS BLD VENIPUNCTURE: CPT

## 2024-02-29 PROCEDURE — 0QS804Z REPOSITION RIGHT FEMORAL SHAFT WITH INTERNAL FIXATION DEVICE, OPEN APPROACH: ICD-10-PCS | Performed by: STUDENT IN AN ORGANIZED HEALTH CARE EDUCATION/TRAINING PROGRAM

## 2024-02-29 PROCEDURE — A4217 STERILE WATER/SALINE, 500 ML: HCPCS | Performed by: ORTHOPAEDIC SURGERY

## 2024-02-29 PROCEDURE — 7100000000 HC PACU RECOVERY - FIRST 15 MIN: Performed by: ORTHOPAEDIC SURGERY

## 2024-02-29 PROCEDURE — 2500000003 HC RX 250 WO HCPCS: Performed by: NURSE ANESTHETIST, CERTIFIED REGISTERED

## 2024-02-29 PROCEDURE — 3600000004 HC SURGERY LEVEL 4 BASE: Performed by: ORTHOPAEDIC SURGERY

## 2024-02-29 PROCEDURE — 73552 X-RAY EXAM OF FEMUR 2/>: CPT

## 2024-02-29 PROCEDURE — 27507 TREATMENT OF THIGH FRACTURE: CPT | Performed by: ORTHOPAEDIC SURGERY

## 2024-02-29 PROCEDURE — 85027 COMPLETE CBC AUTOMATED: CPT

## 2024-02-29 PROCEDURE — 2720000010 HC SURG SUPPLY STERILE: Performed by: ORTHOPAEDIC SURGERY

## 2024-02-29 PROCEDURE — 2700000000 HC OXYGEN THERAPY PER DAY

## 2024-02-29 PROCEDURE — 6360000002 HC RX W HCPCS: Performed by: NURSE ANESTHETIST, CERTIFIED REGISTERED

## 2024-02-29 PROCEDURE — 6360000002 HC RX W HCPCS: Performed by: ANESTHESIOLOGY

## 2024-02-29 DEVICE — COMPRESSION PLATE BROAD CURVED
Type: IMPLANTABLE DEVICE | Site: FEMUR | Status: FUNCTIONAL
Brand: VARIAX

## 2024-02-29 DEVICE — LOCKING SCREW
Type: IMPLANTABLE DEVICE | Site: FEMUR | Status: FUNCTIONAL
Brand: VARIAX

## 2024-02-29 DEVICE — IMPLANTABLE DEVICE
Type: IMPLANTABLE DEVICE | Site: FEMUR | Status: FUNCTIONAL
Brand: CABLE-READY®

## 2024-02-29 DEVICE — BONE SCREW
Type: IMPLANTABLE DEVICE | Site: FEMUR | Status: FUNCTIONAL
Brand: VARIAX

## 2024-02-29 RX ORDER — OXYCODONE HYDROCHLORIDE 5 MG/1
10 TABLET ORAL EVERY 4 HOURS PRN
Status: DISCONTINUED | OUTPATIENT
Start: 2024-02-29 | End: 2024-03-04 | Stop reason: HOSPADM

## 2024-02-29 RX ORDER — ACETAMINOPHEN 325 MG/1
650 TABLET ORAL EVERY 6 HOURS
Qty: 240 TABLET | Refills: 0 | Status: SHIPPED | OUTPATIENT
Start: 2024-02-29 | End: 2024-03-30

## 2024-02-29 RX ORDER — SODIUM CHLORIDE 9 MG/ML
INJECTION, SOLUTION INTRAVENOUS PRN
Status: DISCONTINUED | OUTPATIENT
Start: 2024-02-29 | End: 2024-03-04 | Stop reason: HOSPADM

## 2024-02-29 RX ORDER — OXYCODONE HYDROCHLORIDE 5 MG/1
5 TABLET ORAL EVERY 4 HOURS PRN
Status: DISCONTINUED | OUTPATIENT
Start: 2024-02-29 | End: 2024-03-04 | Stop reason: HOSPADM

## 2024-02-29 RX ORDER — SODIUM CHLORIDE 450 MG/100ML
INJECTION, SOLUTION INTRAVENOUS CONTINUOUS
Status: DISCONTINUED | OUTPATIENT
Start: 2024-02-29 | End: 2024-03-04 | Stop reason: HOSPADM

## 2024-02-29 RX ORDER — ONDANSETRON 2 MG/ML
INJECTION INTRAMUSCULAR; INTRAVENOUS PRN
Status: DISCONTINUED | OUTPATIENT
Start: 2024-02-29 | End: 2024-02-29 | Stop reason: SDUPTHER

## 2024-02-29 RX ORDER — METHOCARBAMOL 500 MG/1
500 TABLET, FILM COATED ORAL 4 TIMES DAILY
Qty: 40 TABLET | Refills: 0 | Status: SHIPPED | OUTPATIENT
Start: 2024-02-29 | End: 2024-03-10

## 2024-02-29 RX ORDER — METHOCARBAMOL 500 MG/1
500 TABLET, FILM COATED ORAL 4 TIMES DAILY
Status: DISCONTINUED | OUTPATIENT
Start: 2024-02-29 | End: 2024-02-29

## 2024-02-29 RX ORDER — MAGNESIUM HYDROXIDE 1200 MG/15ML
LIQUID ORAL CONTINUOUS PRN
Status: COMPLETED | OUTPATIENT
Start: 2024-02-29 | End: 2024-02-29

## 2024-02-29 RX ORDER — POLYETHYLENE GLYCOL 3350 17 G/17G
17 POWDER, FOR SOLUTION ORAL DAILY PRN
Qty: 30 PACKET | Refills: 0 | Status: SHIPPED | OUTPATIENT
Start: 2024-02-29 | End: 2024-03-30

## 2024-02-29 RX ORDER — DEXAMETHASONE SODIUM PHOSPHATE 10 MG/ML
INJECTION INTRAMUSCULAR; INTRAVENOUS PRN
Status: DISCONTINUED | OUTPATIENT
Start: 2024-02-29 | End: 2024-02-29 | Stop reason: SDUPTHER

## 2024-02-29 RX ORDER — OXYCODONE HYDROCHLORIDE 5 MG/1
5 TABLET ORAL EVERY 4 HOURS PRN
Qty: 28 TABLET | Refills: 0 | Status: SHIPPED | OUTPATIENT
Start: 2024-02-29 | End: 2024-03-07

## 2024-02-29 RX ORDER — PROPOFOL 10 MG/ML
INJECTION, EMULSION INTRAVENOUS PRN
Status: DISCONTINUED | OUTPATIENT
Start: 2024-02-29 | End: 2024-02-29 | Stop reason: SDUPTHER

## 2024-02-29 RX ORDER — SODIUM CHLORIDE 0.9 % (FLUSH) 0.9 %
5-40 SYRINGE (ML) INJECTION PRN
Status: DISCONTINUED | OUTPATIENT
Start: 2024-02-29 | End: 2024-03-04 | Stop reason: HOSPADM

## 2024-02-29 RX ORDER — ENOXAPARIN SODIUM 100 MG/ML
40 INJECTION SUBCUTANEOUS DAILY
Status: DISCONTINUED | OUTPATIENT
Start: 2024-03-01 | End: 2024-03-04 | Stop reason: HOSPADM

## 2024-02-29 RX ORDER — LABETALOL HYDROCHLORIDE 5 MG/ML
10 INJECTION, SOLUTION INTRAVENOUS
Status: DISCONTINUED | OUTPATIENT
Start: 2024-02-29 | End: 2024-02-29 | Stop reason: HOSPADM

## 2024-02-29 RX ORDER — OLANZAPINE 5 MG/1
5 TABLET ORAL NIGHTLY
Status: DISCONTINUED | OUTPATIENT
Start: 2024-02-29 | End: 2024-03-04 | Stop reason: HOSPADM

## 2024-02-29 RX ORDER — OXYCODONE HYDROCHLORIDE 5 MG/1
5 TABLET ORAL PRN
Status: DISCONTINUED | OUTPATIENT
Start: 2024-02-29 | End: 2024-02-29 | Stop reason: HOSPADM

## 2024-02-29 RX ORDER — ACETAMINOPHEN 325 MG/1
650 TABLET ORAL EVERY 6 HOURS
Status: DISCONTINUED | OUTPATIENT
Start: 2024-02-29 | End: 2024-03-04 | Stop reason: HOSPADM

## 2024-02-29 RX ORDER — SODIUM CHLORIDE 0.9 % (FLUSH) 0.9 %
5-40 SYRINGE (ML) INJECTION EVERY 12 HOURS SCHEDULED
Status: DISCONTINUED | OUTPATIENT
Start: 2024-02-29 | End: 2024-02-29 | Stop reason: HOSPADM

## 2024-02-29 RX ORDER — ONDANSETRON 2 MG/ML
4 INJECTION INTRAMUSCULAR; INTRAVENOUS
Status: DISCONTINUED | OUTPATIENT
Start: 2024-02-29 | End: 2024-02-29 | Stop reason: HOSPADM

## 2024-02-29 RX ORDER — SODIUM CHLORIDE 9 MG/ML
INJECTION, SOLUTION INTRAVENOUS PRN
Status: DISCONTINUED | OUTPATIENT
Start: 2024-02-29 | End: 2024-02-29 | Stop reason: HOSPADM

## 2024-02-29 RX ORDER — MORPHINE SULFATE 4 MG/ML
4 INJECTION, SOLUTION INTRAMUSCULAR; INTRAVENOUS EVERY 4 HOURS PRN
Status: DISCONTINUED | OUTPATIENT
Start: 2024-02-29 | End: 2024-03-01

## 2024-02-29 RX ORDER — SODIUM CHLORIDE 0.9 % (FLUSH) 0.9 %
5-40 SYRINGE (ML) INJECTION PRN
Status: DISCONTINUED | OUTPATIENT
Start: 2024-02-29 | End: 2024-02-29 | Stop reason: HOSPADM

## 2024-02-29 RX ORDER — SODIUM CHLORIDE 0.9 % (FLUSH) 0.9 %
5-40 SYRINGE (ML) INJECTION EVERY 12 HOURS SCHEDULED
Status: DISCONTINUED | OUTPATIENT
Start: 2024-02-29 | End: 2024-03-04 | Stop reason: HOSPADM

## 2024-02-29 RX ORDER — METHOCARBAMOL 500 MG/1
500 TABLET, FILM COATED ORAL 4 TIMES DAILY
Status: DISCONTINUED | OUTPATIENT
Start: 2024-02-29 | End: 2024-03-04 | Stop reason: HOSPADM

## 2024-02-29 RX ORDER — DIPHENHYDRAMINE HYDROCHLORIDE 50 MG/ML
12.5 INJECTION INTRAMUSCULAR; INTRAVENOUS
Status: DISCONTINUED | OUTPATIENT
Start: 2024-02-29 | End: 2024-02-29 | Stop reason: HOSPADM

## 2024-02-29 RX ORDER — OXYCODONE HYDROCHLORIDE 5 MG/1
10 TABLET ORAL PRN
Status: DISCONTINUED | OUTPATIENT
Start: 2024-02-29 | End: 2024-02-29 | Stop reason: HOSPADM

## 2024-02-29 RX ORDER — CEFAZOLIN SODIUM IN 0.9 % NACL 2 G/100 ML
2000 PLASTIC BAG, INJECTION (ML) INTRAVENOUS
Status: COMPLETED | OUTPATIENT
Start: 2024-02-29 | End: 2024-02-29

## 2024-02-29 RX ORDER — LIDOCAINE HYDROCHLORIDE 20 MG/ML
INJECTION, SOLUTION INFILTRATION; PERINEURAL PRN
Status: DISCONTINUED | OUTPATIENT
Start: 2024-02-29 | End: 2024-02-29 | Stop reason: SDUPTHER

## 2024-02-29 RX ORDER — CEFAZOLIN SODIUM IN 0.9 % NACL 2 G/100 ML
2000 PLASTIC BAG, INJECTION (ML) INTRAVENOUS EVERY 8 HOURS
Status: COMPLETED | OUTPATIENT
Start: 2024-03-01 | End: 2024-03-01

## 2024-02-29 RX ORDER — FENTANYL CITRATE 50 UG/ML
INJECTION, SOLUTION INTRAMUSCULAR; INTRAVENOUS PRN
Status: DISCONTINUED | OUTPATIENT
Start: 2024-02-29 | End: 2024-02-29 | Stop reason: SDUPTHER

## 2024-02-29 RX ORDER — ROCURONIUM BROMIDE 10 MG/ML
INJECTION, SOLUTION INTRAVENOUS PRN
Status: DISCONTINUED | OUTPATIENT
Start: 2024-02-29 | End: 2024-02-29 | Stop reason: SDUPTHER

## 2024-02-29 RX ADMIN — SODIUM CHLORIDE: 9 INJECTION, SOLUTION INTRAVENOUS at 19:51

## 2024-02-29 RX ADMIN — Medication 2000 MG: at 09:47

## 2024-02-29 RX ADMIN — Medication 2000 MG: at 18:37

## 2024-02-29 RX ADMIN — HYDROMORPHONE HYDROCHLORIDE 0.25 MG: 1 INJECTION, SOLUTION INTRAMUSCULAR; INTRAVENOUS; SUBCUTANEOUS at 20:43

## 2024-02-29 RX ADMIN — DEXAMETHASONE SODIUM PHOSPHATE 10 MG: 10 INJECTION INTRAMUSCULAR; INTRAVENOUS at 18:25

## 2024-02-29 RX ADMIN — ONDANSETRON 4 MG: 2 INJECTION INTRAMUSCULAR; INTRAVENOUS at 18:25

## 2024-02-29 RX ADMIN — SODIUM CHLORIDE 1000 ML: 9 INJECTION, SOLUTION INTRAVENOUS at 06:37

## 2024-02-29 RX ADMIN — SODIUM CHLORIDE 1000 ML: 9 INJECTION, SOLUTION INTRAVENOUS at 09:45

## 2024-02-29 RX ADMIN — MORPHINE SULFATE 1 MG: 2 INJECTION, SOLUTION INTRAMUSCULAR; INTRAVENOUS at 12:21

## 2024-02-29 RX ADMIN — ROCURONIUM BROMIDE 50 MG: 50 INJECTION, SOLUTION INTRAVENOUS at 18:25

## 2024-02-29 RX ADMIN — FENTANYL CITRATE 25 MCG: 50 INJECTION, SOLUTION INTRAMUSCULAR; INTRAVENOUS at 19:22

## 2024-02-29 RX ADMIN — LIDOCAINE HYDROCHLORIDE 80 MG: 20 INJECTION, SOLUTION INFILTRATION; PERINEURAL at 18:25

## 2024-02-29 RX ADMIN — Medication 2000 MG: at 00:44

## 2024-02-29 RX ADMIN — PROPOFOL 150 MG: 10 INJECTION, EMULSION INTRAVENOUS at 18:25

## 2024-02-29 RX ADMIN — ACETAMINOPHEN 650 MG: 325 TABLET ORAL at 10:53

## 2024-02-29 RX ADMIN — SODIUM CHLORIDE: 4.5 INJECTION, SOLUTION INTRAVENOUS at 21:39

## 2024-02-29 RX ADMIN — Medication 10 ML: at 21:39

## 2024-02-29 RX ADMIN — FENTANYL CITRATE 25 MCG: 50 INJECTION, SOLUTION INTRAMUSCULAR; INTRAVENOUS at 19:13

## 2024-02-29 RX ADMIN — METHOCARBAMOL 500 MG: 500 TABLET ORAL at 10:53

## 2024-02-29 RX ADMIN — SUGAMMADEX 200 MG: 100 INJECTION, SOLUTION INTRAVENOUS at 19:59

## 2024-02-29 ASSESSMENT — PAIN SCALES - PAIN ASSESSMENT IN ADVANCED DEMENTIA (PAINAD)
CONSOLABILITY: NO NEED TO CONSOLE
NEGVOCALIZATION: 0
BREATHING: NORMAL
BREATHING: 0
FACIALEXPRESSION: 0
FACIALEXPRESSION: SMILING OR INEXPRESSIVE
BREATHING: NORMAL
FACIALEXPRESSION: 0
BREATHING: 0
CONSOLABILITY: NO NEED TO CONSOLE
NEGVOCALIZATION: 0
CONSOLABILITY: NO NEED TO CONSOLE
CONSOLABILITY: 0
BODYLANGUAGE: RELAXED
FACIALEXPRESSION: 0
BREATHING: 0
BODYLANGUAGE: 0
BREATHING: 0
BODYLANGUAGE: 0
TOTALSCORE: 0
CONSOLABILITY: 0
FACIALEXPRESSION: SMILING OR INEXPRESSIVE
BREATHING: NORMAL
TOTALSCORE: 0
CONSOLABILITY: 0
BREATHING: NORMAL
BODYLANGUAGE: RELAXED
NEGVOCALIZATION: 0
TOTALSCORE: 0
BODYLANGUAGE: RELAXED
FACIALEXPRESSION: 0
TOTALSCORE: 0
NEGVOCALIZATION: 0
FACIALEXPRESSION: SMILING OR INEXPRESSIVE
CONSOLABILITY: NO NEED TO CONSOLE
FACIALEXPRESSION: SMILING OR INEXPRESSIVE
BODYLANGUAGE: RELAXED
CONSOLABILITY: 0

## 2024-02-29 ASSESSMENT — PAIN SCALES - WONG BAKER
WONGBAKER_NUMERICALRESPONSE: NO HURT
WONGBAKER_NUMERICALRESPONSE: 0

## 2024-02-29 ASSESSMENT — PAIN SCALES - GENERAL: PAINLEVEL_OUTOF10: 0

## 2024-02-29 ASSESSMENT — PAIN - FUNCTIONAL ASSESSMENT: PAIN_FUNCTIONAL_ASSESSMENT: PAIN ASSESSMENT IN ADVANCED DEMENTIA (PAINAD)

## 2024-02-29 NOTE — CARE COORDINATION
Chart reviewed day 1. Pt is followed by IM and Ortho. POD 1 R hip nail w/nick and plans to return to OR today for stabilization. Per dtrs preference referrals placed to [facility], [facility] and [facility], in order of preference. Will need cert. Dtr reports she no longer wants a referral to [facility]. Pt from [facility] prior to admission. Will follow for needs as they arise. Electronically signed by SONALI WOLF RN on 2/29/2024 at 10:08 AM

## 2024-02-29 NOTE — ANESTHESIA PRE PROCEDURE
Department of Anesthesiology  Preprocedure Note       Name:  Sonia Benavides   Age:  88 y.o.  :  1935                                          MRN:  [id number]         Date:  2024      Surgeon: Surgeon(s):  Comfort Courtney MD    Procedure: Procedure(s):  RIGHT FEMUR OPEN REDUCTION INTERNAL FIXATION    Medications prior to admission:   Prior to Admission medications    Medication Sig Start Date End Date Taking? Authorizing Provider   oxyCODONE (ROXICODONE) 5 MG immediate release tablet Take 1 tablet by mouth every 4 hours as needed for Pain for up to 7 days. Max Daily Amount: 30 mg 2/29/24 3/7/24 Yes Marilee Cates PA   acetaminophen (TYLENOL) 325 MG tablet Take 2 tablets by mouth every 6 hours 2/29/24 3/30/24 Yes Marilee Cates PA   polyethylene glycol (GLYCOLAX) 17 g packet Take 1 packet by mouth daily as needed for Constipation 2/29/24 3/30/24 Yes Marilee Cates PA   methocarbamol (ROBAXIN) 500 MG tablet Take 1 tablet by mouth 4 times daily for 10 days 2/29/24 3/10/24 Yes Marilee Cates PA   donepezil (ARICEPT) 10 MG tablet Take 1 tablet by mouth daily 24   Ziyad Dumont MD   melatonin 5 MG TBDP disintegrating tablet Take 1 tablet by mouth every evening 2/13/24 3/14/24  Ziyad Dumont MD   memantine (NAMENDA) 5 MG tablet Take 1 tablet by mouth 2 times daily 24   Ziyad Dumont MD   vitamin B-12 1000 MCG tablet Take 1 tablet by mouth daily 24   Ziyad Dumont MD       Current medications:    Current Facility-Administered Medications   Medication Dose Route Frequency Provider Last Rate Last Admin   • ceFAZolin (ANCEF) 2000 mg in 0.9% sodium chloride 100 mL IVPB  2,000 mg IntraVENous On Call to OR Marilee Cates PA       • acetaminophen (TYLENOL) tablet 650 mg  650 mg Oral Q6H Marilee Cates PA   650 mg at 24 1053   • oxyCODONE (ROXICODONE) immediate release tablet 5 mg  5 mg Oral Q4H PRN Marilee Cates, PA

## 2024-02-29 NOTE — CARE COORDINATION
Writer spoke w/[facility] r/t pt returning at d/c; they would like to complete a eval once pt is able (can be completed March 2nd or 4th if notified), if pt can bear weight they will most likely accept pt back. Dtrs preference is return to [facility]. Electronically signed by SONALI WOLF RN on 2/29/2024 at 3:19 PM

## 2024-02-29 NOTE — PLAN OF CARE
Problem: Pain  Goal: Verbalizes/displays adequate comfort level or baseline comfort level  Outcome: Progressing  Pt scoring pain on advanced dementia scale.      Problem: Safety - Adult  Goal: Free from fall injury  Outcome: Progressing  Bed in lowest position, wheels locked, 2/4 side rails up, nonskid footwear on. Bed/ chair check alarm in place, call light within reach.

## 2024-02-29 NOTE — PLAN OF CARE
Orthopedic Plan of Care    XR of the right femur demonstrate concerning findings for a minimally displaced fracture extending distal to the nail in the shaft of the femur.    Plan  - NPO  - I called and discussed the situation with the patient's daughter, Juan A. Given the findings on imaging, I am concerned that she could sustain a new fracture at the midshaft of the femur distal to the nail if she bears weight. Given this concern, I discussed the case with my trauma partner, Dr. Courtney, last evening. We will plan on moving forward with operative stabilization of the fracture today with Dr. Courtney. She verbalized understanding of the situation.  - NWB right lower extremity, hold on PT/OT for now  - Surgical timing to be determined    Wilman Sanches DO  02/29/24 8:10 AM

## 2024-02-29 NOTE — DISCHARGE INSTR - COC
Continuity of Care Form    Patient Name: Sonia Benavides   :  1935  MRN:  [id number]    Admit date:  2024  Discharge date:  3/4/24    Code Status Order: Limited   Advance Directives:   Advance Care Flowsheet Documentation       Date/Time Healthcare Directive Type of Healthcare Directive Copy in Chart Healthcare Agent Appointed Healthcare Agent's Name Healthcare Agent's Phone Number    24 1500 Yes, patient has an advance directive for healthcare treatment Living will;Health care treatment directive;Durable power of  for health care -- -- -- --            Admitting Physician:  Venkat Cortez DO  PCP: No primary care provider on file.    Discharging Nurse[facility] Kallie  [facility]  Discharging Unit Phone Number: [phone]    Emergency Contact:   Extended Emergency Contact Information  Primary Emergency Contact: Juan A Benavides  Home Phone: [phone]  Work Phone: [phone]  Mobile Phone: [phone]  Relation: Child   needed? No  Secondary Emergency Contact: Juan A Benavides [facility]  Home Phone: [phone]  Relation: Child    Past Surgical History:  Past Surgical History:   Procedure Laterality Date    FEMUR SURGERY Right 2024    RIGHT FEMUR INTRAMEDULLARY NAIL VERN INSERTION ANTEGRADE performed by Wilman Sanches DO at [facility] OR       Immunization History:   Immunization History   Administered Date(s) Administered    COVID-19, PFIZER PURPLE top, DILUTE for use, (age 12 y+), 30mcg/0.3mL 2021, 2021    Influenza Vaccine, unspecified formulation 10/13/2015       Active Problems:  Patient Active Problem List   Diagnosis Code    Squamous cell skin cancer, upper arm C44.621    Dementia with behavioral disturbance (HCC) F03.918    Elevated BP without diagnosis of hypertension R03.0    Low serum vitamin B12 E53.8    Hip fracture requiring operative repair, right, closed, initial encounter ([facility]) S72.001A    Closed

## 2024-02-29 NOTE — PLAN OF CARE
Ortho Plan of Care    Post operative XR reviewed of the right hip, demonstrating a nondisplaced spiral fracture of the midshaft of the femur distal to the nail vs artifact.    Plan  - Non-weightbearing right leg  - Hold anticoagulation  - XR R femur  - NPO after midnight  - Further plan to follow pending XR of the femur    Wilman Sanches,   02/28/24 10:15 PM

## 2024-03-01 LAB
HCT VFR BLD AUTO: 26.9 % (ref 36–48)
HGB BLD-MCNC: 9 G/DL (ref 12–16)

## 2024-03-01 PROCEDURE — 2580000003 HC RX 258: Performed by: ORTHOPAEDIC SURGERY

## 2024-03-01 PROCEDURE — APPNB45 APP NON BILLABLE 31-45 MINUTES: Performed by: SPECIALIST/TECHNOLOGIST

## 2024-03-01 PROCEDURE — 6370000000 HC RX 637 (ALT 250 FOR IP): Performed by: SPECIALIST/TECHNOLOGIST

## 2024-03-01 PROCEDURE — 97530 THERAPEUTIC ACTIVITIES: CPT

## 2024-03-01 PROCEDURE — 99024 POSTOP FOLLOW-UP VISIT: CPT | Performed by: SPECIALIST/TECHNOLOGIST

## 2024-03-01 PROCEDURE — 94761 N-INVAS EAR/PLS OXIMETRY MLT: CPT

## 2024-03-01 PROCEDURE — 6370000000 HC RX 637 (ALT 250 FOR IP): Performed by: ORTHOPAEDIC SURGERY

## 2024-03-01 PROCEDURE — 85018 HEMOGLOBIN: CPT

## 2024-03-01 PROCEDURE — 1200000000 HC SEMI PRIVATE

## 2024-03-01 PROCEDURE — 85014 HEMATOCRIT: CPT

## 2024-03-01 PROCEDURE — 6360000002 HC RX W HCPCS: Performed by: ORTHOPAEDIC SURGERY

## 2024-03-01 PROCEDURE — 2700000000 HC OXYGEN THERAPY PER DAY

## 2024-03-01 PROCEDURE — 97535 SELF CARE MNGMENT TRAINING: CPT

## 2024-03-01 PROCEDURE — 97162 PT EVAL MOD COMPLEX 30 MIN: CPT

## 2024-03-01 PROCEDURE — 97167 OT EVAL HIGH COMPLEX 60 MIN: CPT

## 2024-03-01 RX ORDER — POLYETHYLENE GLYCOL 3350 17 G/17G
17 POWDER, FOR SOLUTION ORAL ONCE
Status: COMPLETED | OUTPATIENT
Start: 2024-03-01 | End: 2024-03-01

## 2024-03-01 RX ADMIN — Medication 2000 MG: at 01:58

## 2024-03-01 RX ADMIN — ACETAMINOPHEN 650 MG: 325 TABLET ORAL at 10:51

## 2024-03-01 RX ADMIN — SODIUM CHLORIDE: 4.5 INJECTION, SOLUTION INTRAVENOUS at 13:05

## 2024-03-01 RX ADMIN — METHOCARBAMOL 500 MG: 500 TABLET ORAL at 13:06

## 2024-03-01 RX ADMIN — POLYETHYLENE GLYCOL 3350 17 G: 17 POWDER, FOR SOLUTION ORAL at 10:51

## 2024-03-01 RX ADMIN — ENOXAPARIN SODIUM 40 MG: 100 INJECTION SUBCUTANEOUS at 08:14

## 2024-03-01 RX ADMIN — Medication 2000 MG: at 10:54

## 2024-03-01 RX ADMIN — METHOCARBAMOL 500 MG: 500 TABLET ORAL at 08:13

## 2024-03-01 RX ADMIN — OXYCODONE 10 MG: 5 TABLET ORAL at 15:31

## 2024-03-01 RX ADMIN — MORPHINE SULFATE 4 MG: 4 INJECTION, SOLUTION INTRAMUSCULAR; INTRAVENOUS at 02:40

## 2024-03-01 ASSESSMENT — PAIN SCALES - PAIN ASSESSMENT IN ADVANCED DEMENTIA (PAINAD)
NEGVOCALIZATION: 0
NEGVOCALIZATION: 0
FACIALEXPRESSION: 0
BODYLANGUAGE: RELAXED
CONSOLABILITY: 0
TOTALSCORE: 0
BREATHING: 0
FACIALEXPRESSION: 0
FACIALEXPRESSION: 0
BREATHING: NORMAL
FACIALEXPRESSION: 0
BREATHING: NORMAL
BREATHING: 0
FACIALEXPRESSION: SMILING OR INEXPRESSIVE
CONSOLABILITY: NO NEED TO CONSOLE
BREATHING: 0
CONSOLABILITY: NO NEED TO CONSOLE
CONSOLABILITY: 0
BODYLANGUAGE: 0
BREATHING: NORMAL
BODYLANGUAGE: 0
TOTALSCORE: 0
NEGVOCALIZATION: 0
FACIALEXPRESSION: SMILING OR INEXPRESSIVE
TOTALSCORE: 0
FACIALEXPRESSION: SMILING OR INEXPRESSIVE
BODYLANGUAGE: RELAXED
BODYLANGUAGE: 0
FACIALEXPRESSION: SMILING OR INEXPRESSIVE
BODYLANGUAGE: RELAXED
CONSOLABILITY: 0
TOTALSCORE: 0
BODYLANGUAGE: RELAXED
FACIALEXPRESSION: SMILING OR INEXPRESSIVE
NEGVOCALIZATION: 0
CONSOLABILITY: NO NEED TO CONSOLE
CONSOLABILITY: NO NEED TO CONSOLE
FACIALEXPRESSION: 0
BREATHING: 0
BREATHING: NORMAL
BODYLANGUAGE: 0
BREATHING: 0
BODYLANGUAGE: 0
BREATHING: NORMAL
TOTALSCORE: 0
CONSOLABILITY: NO NEED TO CONSOLE
CONSOLABILITY: 0
NEGVOCALIZATION: 0
BODYLANGUAGE: RELAXED
CONSOLABILITY: 0

## 2024-03-01 ASSESSMENT — PAIN SCALES - GENERAL
PAINLEVEL_OUTOF10: 0
PAINLEVEL_OUTOF10: 8
PAINLEVEL_OUTOF10: 0
PAINLEVEL_OUTOF10: 0
PAINLEVEL_OUTOF10: 8

## 2024-03-01 ASSESSMENT — PAIN DESCRIPTION - LOCATION
LOCATION: LEG
LOCATION: HIP

## 2024-03-01 ASSESSMENT — PAIN SCALES - WONG BAKER
WONGBAKER_NUMERICALRESPONSE: 8
WONGBAKER_NUMERICALRESPONSE: NO HURT
WONGBAKER_NUMERICALRESPONSE: HURTS WHOLE LOT
WONGBAKER_NUMERICALRESPONSE: 0
WONGBAKER_NUMERICALRESPONSE: NO HURT
WONGBAKER_NUMERICALRESPONSE: NO HURT

## 2024-03-01 ASSESSMENT — PAIN DESCRIPTION - DESCRIPTORS
DESCRIPTORS: ACHING
DESCRIPTORS: PATIENT UNABLE TO DESCRIBE

## 2024-03-01 ASSESSMENT — PAIN DESCRIPTION - ORIENTATION
ORIENTATION: RIGHT
ORIENTATION: RIGHT

## 2024-03-01 NOTE — PLAN OF CARE
Problem: Discharge Planning  Goal: Discharge to home or other facility with appropriate resources  3/1/2024 1020 by Jessica Erickson RN  Outcome: Progressing  2/29/2024 2311 by Michelle Rosales RN  Outcome: Progressing     Problem: Pain  Goal: Verbalizes/displays adequate comfort level or baseline comfort level  3/1/2024 1020 by Jessica Erickson RN  Outcome: Progressing  2/29/2024 2311 by Michelle Rosales RN  Outcome: Progressing     Problem: Safety - Adult  Goal: Free from fall injury  3/1/2024 1020 by Jessica Erickson RN  Outcome: Progressing  2/29/2024 2311 by Michelle Rosales RN  Outcome: Progressing     Problem: Skin/Tissue Integrity  Goal: Absence of new skin breakdown  Description: 1.  Monitor for areas of redness and/or skin breakdown  2.  Assess vascular access sites hourly  3.  Every 4-6 hours minimum:  Change oxygen saturation probe site  4.  Every 4-6 hours:  If on nasal continuous positive airway pressure, respiratory therapy assess nares and determine need for appliance change or resting period.  3/1/2024 1020 by Jessica Erickson RN  Outcome: Progressing  2/29/2024 2311 by Michelle Rosales RN  Outcome: Progressing

## 2024-03-01 NOTE — OP NOTE
[address] [address]                            OPERATIVE REPORT      PATIENT NAME: JENNIFER TONG            : 1935  MED REC NO: [id number]                      ROOM: 0527  ACCOUNT NO: [id number]                       ADMIT DATE: 2024  PROVIDER: Comfort Courtney MD      DATE OF PROCEDURE:  2024    SURGEON:  Comfort Courtney MD    ASSISTANT:  Lamar surgical assistant.    PREOPERATIVE DIAGNOSIS:  Right femur shaft and periprosthetic at the tip of prosthesis fracture.    POSTOPERATIVE DIAGNOSIS:  Right femur shaft and periprosthetic at the tip of prosthesis fracture.    PROCEDURE DONE:  Open treatment of right femoral shaft fracture with plate and screws with cables.    ANESTHESIA:  General anesthesia.    BLOOD LOSS:  100 mL.    COMPLICATIONS:  None.    IMPLANTS USED:    1. Bates Titanium 3.5 VariAx 14-hole locking plate.  2. Lisset Ready cables x2.    INDICATION:  This is an 88-year-old white female who sustained a fall with a right intertrochanteric displaced fracture.  She underwent an operative fixation on 2024 by my partner, Dr. Sanches.  She had a short TFN nail.  Surgery was uneventful.  Postop x-ray showed a minimally displaced spiral fracture at the tip of the nail.  We recommended surgical fixation.  All risks, benefits, and alternatives were discussed with the patient and her daughter, and they agreed to proceed with surgical fixation.    Given the patient's periprosthetic fracture added significant challenge to the procedure. It required significant physical and mental effort. It required 60% more time for such procedure.     DESCRIPTION OF THE PROCEDURE:  The patient's right thigh was marked.  She received 2 g Ancef IV preoperatively.  The patient was then brought to the operating room, underwent general anesthesia.  The patient was then placed in the left lateral decubitus position with the 
and OT will work with the patient postoperatively as well social work for potential placement.  The patient will be on Lovenox for 6 weeks for DVT prophylaxis if cleared from her subdural hematoma from IM/neurosurgery.  Orals for pain control with IV for breakthrough only.  Follow-up with me in 10 to 14 days for repeat x-rays in the outpatient setting.  Follow-up with the patient's primary care provider recommended to evaluate for osteoporosis given the fragility fracture they sustained.      Electronically signed by Wilman Sanches DO on 2/28/2024 at 4:42 PM

## 2024-03-01 NOTE — BRIEF OP NOTE
Brief Postoperative Note      Patient: Sonia Benavides  YOB: 1935  MRN: [id number]    Date of Procedure: 2/29/2024    Pre-Op Diagnosis Codes:    Right femur shaft periprosthetic fracture.    Post-Op Diagnosis: Same       Procedure(s):  RIGHT FEMUR OPEN REDUCTION INTERNAL FIXATION    Surgeon(s):  Comfort Courtney MD    Assistant:  Surgical Assistant: Anthony Floyd    Anesthesia: General    Estimated Blood Loss (mL): less than 100     Complications: None    Specimens:   * No specimens in log *    Implants:  Implant Name Type Inv. Item Serial No.  Lot No. LRB No. Used Action   CABLE SURG L635MM DIA1.8MM CO CHROM CERCLAGE CBL RDY - [id number]  CABLE SURG L635MM DIA1.8MM CO CHROM CERCLAGE CBL RDY  [employer]- [id number] Right 1 Implanted   CABLE SURG L635MM DIA1.8MM CO CHROM CERCLAGE CBL RDY - [id number]  CABLE SURG L635MM DIA1.8MM CO CHROM CERCLAGE CBL RDY  [employer]- [id number] Right 1 Implanted   PLATE VARIAX COMP BROAD CURVED 14HL - [id number]  PLATE VARIAX COMP BROAD CURVED 14HL  [employer] [facility]  Right 1 Implanted   SCREW BNE L10MM DIA3.5MM CANC TI CECI FULL THRD T10 DRV FOR - [id number]  SCREW BNE L10MM DIA3.5MM CANC TI CECI FULL THRD T10 DRV FOR  [employer] [facility]  Right 3 Implanted   SCREW BNE L26MM DIA3.5MM CANC TI CECI FULL THRD T10 DRV FOR - [id number]  SCREW BNE L26MM DIA3.5MM CANC TI CECI FULL THRD T10 DRV FOR  [employer] [facility]  Right 2 Implanted   SCREW BNE L28MM DIA3.5MM TI ALLY CECI FULL THRD T10 DRV - [id number]  SCREW BNE L28MM DIA3.5MM TI ALLY CECI FULL THRD T10 DRV  [employer] [facility]  Right 2 Implanted   SCREW 3.5MM L30MM CECI FT T10 - [id number]  SCREW 3.5MM L30MM CECI FT T10  [employer]  Right 1 Implanted   SCREW BNE L28MM DIA3.5MM RICH TI NONLOCKING FULL THRD FOR - [id number]  SCREW BNE L28MM DIA3.5MM RICH TI NONLOCKING FULL THRD FOR  [employer]  Right 1 Implanted   SCREW BNE AD

## 2024-03-01 NOTE — ANESTHESIA POSTPROCEDURE EVALUATION
Department of Anesthesiology  Postprocedure Note    Patient: Sonia Benavides  MRN: [id number]  YOB: 1935  Date of evaluation: 2/29/2024    Procedure Summary       Date: 02/29/24 Room / Location: [address]    Anesthesia Start: 1822 Anesthesia Stop: 2018    Procedure: RIGHT FEMUR OPEN REDUCTION INTERNAL FIXATION (Right: Leg Upper) Diagnosis:       Closed fracture of right femur, unspecified fracture morphology, unspecified portion of femur, initial encounter ([facility])      (Closed fracture of right femur, unspecified fracture morphology, unspecified portion of femur, initial encounter ([facility]) [S72.91XA])    Surgeons: Comfort Courtney MD Responsible Provider: Kian Gomes MD    Anesthesia Type: general ASA Status: 2            Anesthesia Type: No value filed.    Reno Phase I: Reno Score: 9    Reno Phase II:      Anesthesia Post Evaluation    Patient location during evaluation: PACU  Patient participation: complete - patient participated  Level of consciousness: awake and alert  Airway patency: patent  Nausea & Vomiting: no nausea and no vomiting  Cardiovascular status: blood pressure returned to baseline  Respiratory status: acceptable  Hydration status: euvolemic  Comments: VSS on transfer to phase 2 recovery.  No anesthetic complications.  Pain management: adequate    No notable events documented.

## 2024-03-01 NOTE — CARE COORDINATION
Chart reviewed day 2. Pt is followed by IM and Ortho. Per Ortho anticipate d/c tomorrow. POD 2 R Femur Nail and POD 1 ORIF. Writer updated Selena at [facility] on pt status, she can be reached at [phone] (Cell) and [phone] (fax). Selena will need to eval pt tomorrow prior to her return, asked to be called 20 mins prior to PT/OT coming to see pt so that she can observe ADLs. Plan [facility] vs [facility] (accepted) or [facility] (accepted). Will follow for needs as they arise.Electronically signed by SONALI WOLF RN on 3/1/2024 at 11:37 AM

## 2024-03-01 NOTE — FLOWSHEET NOTE
02/29/24 2129   Vital Signs   Temp 98.3 °F (36.8 °C)   Temp Source Oral   Pulse 82   Heart Rate Source Monitor   Respirations 18   BP (!) 146/72   MAP (Calculated) 97   Pain Assessment   Pain Assessment Stallworth-Baker FACES   Stallworth-Baker Pain Rating 0   Pain Assessment in Advanced Dementia (PAINAD)   Breathing Independent of Vocalization 0   Negative Vocalization 0   Facial Expression 0   Body Language 0   Consolability 0   PAINAD Score 0   Oxygen Therapy   SpO2 91 %   O2 Device Nasal cannula   O2 Flow Rate (L/min) 2 L/min     Pt arrived to unit from PACUw ith RN. Vitals stable. Pt denies pain. Neurovasc to RLE intact, pedal pulse +3, brisk cap refill, wiggles toes. Does not answer when asked about numbness or tinling, hx of advanced dementia. STALLWORTH SMITH faces scale of pain 0. Scds in  place. Fluids running. Bed alarm set. Avasys in place.

## 2024-03-02 PROCEDURE — 2580000003 HC RX 258: Performed by: ORTHOPAEDIC SURGERY

## 2024-03-02 PROCEDURE — 97530 THERAPEUTIC ACTIVITIES: CPT

## 2024-03-02 PROCEDURE — 97110 THERAPEUTIC EXERCISES: CPT

## 2024-03-02 PROCEDURE — 6370000000 HC RX 637 (ALT 250 FOR IP): Performed by: ORTHOPAEDIC SURGERY

## 2024-03-02 PROCEDURE — 1200000000 HC SEMI PRIVATE

## 2024-03-02 PROCEDURE — 6360000002 HC RX W HCPCS: Performed by: ORTHOPAEDIC SURGERY

## 2024-03-02 PROCEDURE — 99024 POSTOP FOLLOW-UP VISIT: CPT | Performed by: PHYSICIAN ASSISTANT

## 2024-03-02 RX ADMIN — SODIUM CHLORIDE: 4.5 INJECTION, SOLUTION INTRAVENOUS at 13:41

## 2024-03-02 RX ADMIN — METHOCARBAMOL 500 MG: 500 TABLET ORAL at 16:25

## 2024-03-02 RX ADMIN — ACETAMINOPHEN 650 MG: 325 TABLET ORAL at 16:25

## 2024-03-02 RX ADMIN — ACETAMINOPHEN 650 MG: 325 TABLET ORAL at 10:31

## 2024-03-02 RX ADMIN — METHOCARBAMOL 500 MG: 500 TABLET ORAL at 20:12

## 2024-03-02 RX ADMIN — METHOCARBAMOL 500 MG: 500 TABLET ORAL at 08:56

## 2024-03-02 RX ADMIN — SODIUM CHLORIDE: 4.5 INJECTION, SOLUTION INTRAVENOUS at 01:53

## 2024-03-02 RX ADMIN — METHOCARBAMOL 500 MG: 500 TABLET ORAL at 13:41

## 2024-03-02 RX ADMIN — ENOXAPARIN SODIUM 40 MG: 100 INJECTION SUBCUTANEOUS at 08:56

## 2024-03-02 RX ADMIN — OLANZAPINE 5 MG: 5 TABLET, FILM COATED ORAL at 20:12

## 2024-03-02 RX ADMIN — Medication 10 ML: at 20:16

## 2024-03-02 ASSESSMENT — PAIN SCALES - PAIN ASSESSMENT IN ADVANCED DEMENTIA (PAINAD)
FACIALEXPRESSION: SMILING OR INEXPRESSIVE
BODYLANGUAGE: RELAXED
TOTALSCORE: 0
BODYLANGUAGE: 0
CONSOLABILITY: 0
CONSOLABILITY: 0
BREATHING: 0
CONSOLABILITY: NO NEED TO CONSOLE
FACIALEXPRESSION: SMILING OR INEXPRESSIVE
CONSOLABILITY: 0
FACIALEXPRESSION: 0
CONSOLABILITY: 0
NEGVOCALIZATION: 0
BREATHING: 0
FACIALEXPRESSION: SMILING OR INEXPRESSIVE
CONSOLABILITY: 0
BREATHING: NORMAL
NEGVOCALIZATION: 0
FACIALEXPRESSION: 0
BREATHING: 0
CONSOLABILITY: 0
FACIALEXPRESSION: 0
NEGVOCALIZATION: 0
FACIALEXPRESSION: 0
BODYLANGUAGE: 0
BREATHING: NORMAL
BODYLANGUAGE: RELAXED
CONSOLABILITY: 0
BODYLANGUAGE: RELAXED
BODYLANGUAGE: RELAXED
BREATHING: 0
BODYLANGUAGE: 0
CONSOLABILITY: 0
CONSOLABILITY: NO NEED TO CONSOLE
TOTALSCORE: 0
BREATHING: NORMAL
CONSOLABILITY: NO NEED TO CONSOLE
TOTALSCORE: 0
BREATHING: 0
BODYLANGUAGE: RELAXED
BODYLANGUAGE: 0
CONSOLABILITY: NO NEED TO CONSOLE
CONSOLABILITY: NO NEED TO CONSOLE
NEGVOCALIZATION: 0
FACIALEXPRESSION: 0
BREATHING: NORMAL
NEGVOCALIZATION: 0
BREATHING: 0
BREATHING: NORMAL
BODYLANGUAGE: 0
BREATHING: NORMAL
FACIALEXPRESSION: SMILING OR INEXPRESSIVE
BREATHING: NORMAL
CONSOLABILITY: NO NEED TO CONSOLE
TOTALSCORE: 0
FACIALEXPRESSION: SMILING OR INEXPRESSIVE
BODYLANGUAGE: RELAXED
BODYLANGUAGE: RELAXED
BREATHING: 0
FACIALEXPRESSION: SMILING OR INEXPRESSIVE
NEGVOCALIZATION: 0
BODYLANGUAGE: 0
FACIALEXPRESSION: SMILING OR INEXPRESSIVE
BREATHING: 0
FACIALEXPRESSION: 0
CONSOLABILITY: NO NEED TO CONSOLE
TOTALSCORE: 0
BODYLANGUAGE: 0
CONSOLABILITY: NO NEED TO CONSOLE
CONSOLABILITY: 0
TOTALSCORE: 0
NEGVOCALIZATION: 0
FACIALEXPRESSION: SMILING OR INEXPRESSIVE
CONSOLABILITY: NO NEED TO CONSOLE
BREATHING: 0
CONSOLABILITY: NO NEED TO CONSOLE
NEGVOCALIZATION: 0
FACIALEXPRESSION: SMILING OR INEXPRESSIVE
FACIALEXPRESSION: 0
NEGVOCALIZATION: 0
BREATHING: 0
BODYLANGUAGE: RELAXED
BREATHING: NORMAL
CONSOLABILITY: 0
NEGVOCALIZATION: 0
CONSOLABILITY: NO NEED TO CONSOLE
BODYLANGUAGE: 0
FACIALEXPRESSION: 0
TOTALSCORE: 0
CONSOLABILITY: 0
BODYLANGUAGE: 0
BODYLANGUAGE: RELAXED
FACIALEXPRESSION: 0
NEGVOCALIZATION: 0
BODYLANGUAGE: RELAXED
FACIALEXPRESSION: SMILING OR INEXPRESSIVE
FACIALEXPRESSION: 0
BODYLANGUAGE: RELAXED
TOTALSCORE: 0
TOTALSCORE: 0
FACIALEXPRESSION: SMILING OR INEXPRESSIVE
FACIALEXPRESSION: 0
BREATHING: 0
BREATHING: NORMAL

## 2024-03-02 ASSESSMENT — PAIN SCALES - WONG BAKER
WONGBAKER_NUMERICALRESPONSE: 0
WONGBAKER_NUMERICALRESPONSE: NO HURT
WONGBAKER_NUMERICALRESPONSE: 0
WONGBAKER_NUMERICALRESPONSE: NO HURT
WONGBAKER_NUMERICALRESPONSE: 0
WONGBAKER_NUMERICALRESPONSE: 4
WONGBAKER_NUMERICALRESPONSE: 0
WONGBAKER_NUMERICALRESPONSE: 0
WONGBAKER_NUMERICALRESPONSE: NO HURT
WONGBAKER_NUMERICALRESPONSE: 0
WONGBAKER_NUMERICALRESPONSE: NO HURT
WONGBAKER_NUMERICALRESPONSE: NO HURT
WONGBAKER_NUMERICALRESPONSE: 0
WONGBAKER_NUMERICALRESPONSE: NO HURT
WONGBAKER_NUMERICALRESPONSE: 4
WONGBAKER_NUMERICALRESPONSE: NO HURT
WONGBAKER_NUMERICALRESPONSE: HURTS LITTLE MORE
WONGBAKER_NUMERICALRESPONSE: NO HURT
WONGBAKER_NUMERICALRESPONSE: HURTS LITTLE MORE
WONGBAKER_NUMERICALRESPONSE: NO HURT
WONGBAKER_NUMERICALRESPONSE: 0
WONGBAKER_NUMERICALRESPONSE: 0

## 2024-03-02 ASSESSMENT — PAIN DESCRIPTION - ORIENTATION: ORIENTATION: RIGHT

## 2024-03-02 ASSESSMENT — PAIN SCALES - GENERAL
PAINLEVEL_OUTOF10: 4
PAINLEVEL_OUTOF10: 0
PAINLEVEL_OUTOF10: 0

## 2024-03-02 ASSESSMENT — PAIN DESCRIPTION - ONSET: ONSET: ON-GOING

## 2024-03-02 ASSESSMENT — PAIN DESCRIPTION - PAIN TYPE: TYPE: ACUTE PAIN;SURGICAL PAIN

## 2024-03-02 ASSESSMENT — PAIN DESCRIPTION - FREQUENCY: FREQUENCY: CONTINUOUS

## 2024-03-02 ASSESSMENT — PAIN DESCRIPTION - DESCRIPTORS: DESCRIPTORS: ACHING

## 2024-03-02 ASSESSMENT — PAIN - FUNCTIONAL ASSESSMENT: PAIN_FUNCTIONAL_ASSESSMENT: ACTIVITIES ARE NOT PREVENTED

## 2024-03-02 ASSESSMENT — PAIN DESCRIPTION - LOCATION: LOCATION: HIP

## 2024-03-02 NOTE — PLAN OF CARE
Pt resting in bed quietly. Bed in lowest position, wheels locked, side rails up X2, non skid socks on. Bed alarm engaged. [employer] virtual  video monitor engaged. Pt instructed not to get out of bed on own, to use call light for staff assistance when ambulating or other needs. Pt verbalizes understanding. Call light within reach. Will continue to monitor.     Problem: Safety - Adult  Goal: Free from fall injury  Outcome: Progressing         Pt rates pain level using 0-10 pain scale. Pain meds administered as ordered per MAR. Pt instructed to use call light for increasing pain or ineffective pain management. Pt verbalizes understanding. Call light within reach. Will continue to monitor.     Problem: Pain  Goal: Verbalizes/displays adequate comfort level or baseline comfort level  Outcome: Progressing

## 2024-03-02 NOTE — CARE COORDINATION
PT/OT working with pt now. Called Selena and spoke to her. She has been called in to work the shift at facility and is in shift change. She will attempt to leave [facility] and come see PT/OT session. If she is unable to make it, will have to reattempt on Mon as she is not available tomorrow even if PT/OT is.

## 2024-03-02 NOTE — PLAN OF CARE
Problem: Discharge Planning  Goal: Discharge to home or other facility with appropriate resources  3/1/2024 2153 by Stephan Salgado RN  Outcome: Progressing  3/1/2024 1020 by Jessica Erickson RN  Outcome: Progressing     Problem: Pain  Goal: Verbalizes/displays adequate comfort level or baseline comfort level  3/1/2024 2153 by Stephan Salgado RN  Outcome: Progressing  3/1/2024 1020 by Jessica Erickson RN  Outcome: Progressing     Problem: Safety - Adult  Goal: Free from fall injury  3/1/2024 2153 by Stephan Salgado RN  Outcome: Progressing  3/1/2024 1020 by Jessica Erickson RN  Outcome: Progressing     Problem: Skin/Tissue Integrity  Goal: Absence of new skin breakdown  Description: 1.  Monitor for areas of redness and/or skin breakdown  2.  Assess vascular access sites hourly  3.  Every 4-6 hours minimum:  Change oxygen saturation probe site  4.  Every 4-6 hours:  If on nasal continuous positive airway pressure, respiratory therapy assess nares and determine need for appliance change or resting period.  3/1/2024 2153 by Stephan Salgado RN  Outcome: Progressing  3/1/2024 1020 by Jessica Erickson RN  Outcome: Progressing

## 2024-03-03 PROCEDURE — 6360000002 HC RX W HCPCS: Performed by: ORTHOPAEDIC SURGERY

## 2024-03-03 PROCEDURE — 97110 THERAPEUTIC EXERCISES: CPT

## 2024-03-03 PROCEDURE — 6370000000 HC RX 637 (ALT 250 FOR IP): Performed by: ORTHOPAEDIC SURGERY

## 2024-03-03 PROCEDURE — 97530 THERAPEUTIC ACTIVITIES: CPT

## 2024-03-03 PROCEDURE — 1200000000 HC SEMI PRIVATE

## 2024-03-03 PROCEDURE — 2580000003 HC RX 258: Performed by: ORTHOPAEDIC SURGERY

## 2024-03-03 RX ADMIN — METHOCARBAMOL 500 MG: 500 TABLET ORAL at 09:43

## 2024-03-03 RX ADMIN — METHOCARBAMOL 500 MG: 500 TABLET ORAL at 20:37

## 2024-03-03 RX ADMIN — METHOCARBAMOL 500 MG: 500 TABLET ORAL at 17:29

## 2024-03-03 RX ADMIN — ACETAMINOPHEN 650 MG: 325 TABLET ORAL at 09:43

## 2024-03-03 RX ADMIN — ENOXAPARIN SODIUM 40 MG: 100 INJECTION SUBCUTANEOUS at 09:43

## 2024-03-03 RX ADMIN — ACETAMINOPHEN 650 MG: 325 TABLET ORAL at 17:29

## 2024-03-03 RX ADMIN — METHOCARBAMOL 500 MG: 500 TABLET ORAL at 13:23

## 2024-03-03 RX ADMIN — OLANZAPINE 5 MG: 5 TABLET, FILM COATED ORAL at 20:37

## 2024-03-03 RX ADMIN — SODIUM CHLORIDE: 4.5 INJECTION, SOLUTION INTRAVENOUS at 13:25

## 2024-03-03 RX ADMIN — SODIUM CHLORIDE: 4.5 INJECTION, SOLUTION INTRAVENOUS at 01:46

## 2024-03-03 ASSESSMENT — PAIN SCALES - WONG BAKER
WONGBAKER_NUMERICALRESPONSE: 0
WONGBAKER_NUMERICALRESPONSE: 0
WONGBAKER_NUMERICALRESPONSE: NO HURT
WONGBAKER_NUMERICALRESPONSE: NO HURT

## 2024-03-03 ASSESSMENT — PAIN SCALES - GENERAL
PAINLEVEL_OUTOF10: 0
PAINLEVEL_OUTOF10: 3
PAINLEVEL_OUTOF10: 0

## 2024-03-03 NOTE — CARE COORDINATION
Spoke to Selena with [facility], she was unable to get away for PT session yesterday. Reviewed PT/OT recs and note with Selena. She states that although pt has SNF recs, she would still like to attend a PT session here in hospital on Mon to see if pt would be best served to return there. Again available by cell to attend session.     Called to speak with pt dtr Juan A re: possibility of writer starting cert for [facility] in the event that [facility] unable to commit either way to return vs need for SNF. Dtr's preference is heavily weighed to return to [facility], is reluctant to let writer start cert for SNF even though she is aware cert does not have to be used if [facility] can in fact provide for dc. In an effort to secure a decision one way or the other tomorrow, Juan A will call Selena tomorrow am to help ensure PT therapy is observed since that seems to be deciding factor for Selena.

## 2024-03-03 NOTE — PLAN OF CARE
Pt resting in bed quietly. Bed in lowest position, wheels locked, side rails up X3, non skid socks on. Bed alarm engaged. Honey virtual  video monitor engaged. Pt instructed not to get out of bed on own, to use call light for staff assistance when ambulating or other needs. Pt verbalizes understanding. Call light within reach. Will continue to monitor.      Problem: Safety - Adult  Goal: Free from fall injury  3/3/2024 1015 by Ana Baxter, RN  Outcome: Progressing     Problem: ABCDS Injury Assessment  Goal: Absence of physical injury  3/3/2024 1015 by Ana Baxter, RN  Outcome: Progressing         Pt rates pain level using 0-10 pain scale. Pain meds administered as ordered per MAR. Pt instructed to use call light for increasing pain or ineffective pain management. Pt verbalizes understanding. Call light within reach. Will continue to monitor.     Problem: Pain  Goal: Verbalizes/displays adequate comfort level or baseline comfort level  3/3/2024 1015 by Ana Baxter, RN  Outcome: Progressing       Pt encouraged to turn to side every two hours using pillows. Pt continues to refuse to turn off left side, states it is too uncomfortable. Pt educated on risk of developing skin breakdown. Pt verbalizes understanding, no evidence of learning, needs reinforcement. Will continue to encourage pt to turn/reposition throughout shift.    Problem: Skin/Tissue Integrity  Goal: Absence of new skin breakdown  Description: 1.  Monitor for areas of redness and/or skin breakdown  2.  Assess vascular access sites hourly  3.  Every 4-6 hours minimum:  Change oxygen saturation probe site  4.  Every 4-6 hours:  If on nasal continuous positive airway pressure, respiratory therapy assess nares and determine need for appliance change or resting period.  3/3/2024 1015 by Ana Baxter, RN  Outcome: Progressing

## 2024-03-03 NOTE — PLAN OF CARE
Problem: Discharge Planning  Goal: Discharge to home or other facility with appropriate resources  3/2/2024 2031 by Stephan Salgado RN  Outcome: Progressing  3/2/2024 1200 by Ana Baxter RN  Outcome: Progressing     Problem: Pain  Goal: Verbalizes/displays adequate comfort level or baseline comfort level  3/2/2024 2031 by Stephan Salgado RN  Outcome: Progressing  3/2/2024 1200 by Ana Baxter RN  Outcome: Progressing     Problem: Safety - Adult  Goal: Free from fall injury  3/2/2024 2031 by Stephan Salgado RN  Outcome: Progressing  3/2/2024 1200 by Ana Baxter RN  Outcome: Progressing     Problem: Skin/Tissue Integrity  Goal: Absence of new skin breakdown  Description: 1.  Monitor for areas of redness and/or skin breakdown  2.  Assess vascular access sites hourly  3.  Every 4-6 hours minimum:  Change oxygen saturation probe site  4.  Every 4-6 hours:  If on nasal continuous positive airway pressure, respiratory therapy assess nares and determine need for appliance change or resting period.  3/2/2024 2031 by Stephan Salgado RN  Outcome: Progressing  3/2/2024 1200 by Ana Baxter RN  Outcome: Progressing     Problem: ABCDS Injury Assessment  Goal: Absence of physical injury  Outcome: Progressing

## 2024-03-04 ENCOUNTER — APPOINTMENT (OUTPATIENT)
Dept: GENERAL RADIOLOGY | Age: 89
DRG: 956 | End: 2024-03-04
Payer: MEDICARE

## 2024-03-04 VITALS
HEART RATE: 76 BPM | HEIGHT: 60 IN | OXYGEN SATURATION: 96 % | WEIGHT: 145 LBS | TEMPERATURE: 98.6 F | SYSTOLIC BLOOD PRESSURE: 148 MMHG | BODY MASS INDEX: 28.47 KG/M2 | DIASTOLIC BLOOD PRESSURE: 67 MMHG | RESPIRATION RATE: 18 BRPM

## 2024-03-04 PROCEDURE — 71045 X-RAY EXAM CHEST 1 VIEW: CPT

## 2024-03-04 PROCEDURE — 97530 THERAPEUTIC ACTIVITIES: CPT

## 2024-03-04 PROCEDURE — 97116 GAIT TRAINING THERAPY: CPT

## 2024-03-04 PROCEDURE — 97110 THERAPEUTIC EXERCISES: CPT

## 2024-03-04 PROCEDURE — 2580000003 HC RX 258: Performed by: ORTHOPAEDIC SURGERY

## 2024-03-04 PROCEDURE — 6360000002 HC RX W HCPCS: Performed by: ORTHOPAEDIC SURGERY

## 2024-03-04 PROCEDURE — 6370000000 HC RX 637 (ALT 250 FOR IP): Performed by: ORTHOPAEDIC SURGERY

## 2024-03-04 RX ADMIN — METHOCARBAMOL 500 MG: 500 TABLET ORAL at 08:00

## 2024-03-04 RX ADMIN — SODIUM CHLORIDE: 4.5 INJECTION, SOLUTION INTRAVENOUS at 00:51

## 2024-03-04 RX ADMIN — ENOXAPARIN SODIUM 40 MG: 100 INJECTION SUBCUTANEOUS at 08:00

## 2024-03-04 ASSESSMENT — PAIN SCALES - GENERAL: PAINLEVEL_OUTOF10: 0

## 2024-03-04 NOTE — PROGRESS NOTES
Department of Orthopedic Surgery  Physician Assistant   Progress Note    Subjective:       Systemic or Specific Complaints: No complaints, patient is pleasantly confused.  Does not note any pain.  Worked with therapy this morning, tolerating p.o., voiding appropriately.  No family at bedside    Objective:     Patient Vitals for the past 24 hrs:   BP Temp Temp src Pulse Resp SpO2   03/01/24 0952 105/64 -- -- 90 -- 93 %   03/01/24 0811 130/64 98.5 °F (36.9 °C) Oral 86 16 93 %   03/01/24 0305 117/63 98.2 °F (36.8 °C) Oral 78 18 95 %   03/01/24 0240 -- -- -- -- 18 --   02/29/24 2220 (!) 147/65 98.8 °F (37.1 °C) Oral 80 18 94 %   02/29/24 2129 (!) 146/72 98.3 °F (36.8 °C) Oral 82 18 91 %   02/29/24 2105 (!) 160/75 -- -- 92 19 93 %   02/29/24 2055 (!) 153/71 -- -- 83 16 93 %   02/29/24 2050 (!) 156/69 -- -- 84 19 93 %   02/29/24 2045 (!) 176/84 -- -- 92 23 (!) 86 %   02/29/24 2040 (!) 180/79 -- -- 93 11 --   02/29/24 2035 (!) 180/87 -- -- 96 18 90 %   02/29/24 2030 (!) 172/91 -- -- 98 11 90 %   02/29/24 2025 (!) 176/103 -- -- 99 16 --   02/29/24 2024 (!) 176/103 98.6 °F (37 °C) Temporal 97 19 90 %   02/29/24 2020 (!) 173/73 -- -- 97 21 96 %   02/29/24 1800 (!) 120/41 98.2 °F (36.8 °C) Temporal 88 16 92 %   02/29/24 1251 -- -- -- -- 20 --   02/29/24 1053 (!) 143/66 -- -- -- -- --   02/29/24 1052 (!) 143/66 -- -- 87 18 --       General: alert, appears stated age, cooperative, and no distress   Wound: Wound clean and dry no evidence of infection., No Erythema, No Drainage, and Positive for Edema   Motion: Painful range of Motion in affected extremity   DVT Exam: No evidence of DVT seen on physical exam.  No cords or calf tenderness.  No significant calf/ankle edema.     Additional exam: Patient seen sitting up in chair at time of interview  Leg lengths equal, rotational alignment neutral  Thigh moderate swelling as expected, compartments compressible  EHL, FHL, gastroc, and anterior tibialis motor intact  Sensation intact 
  Department of Orthopedic Surgery  Physician Assistant   Progress Note    Subjective:       Systemic or Specific Complaints: No complaints, patient is pleasantly confused.  Patient resting in bed.    Objective:     Patient Vitals for the past 24 hrs:   BP Temp Temp src Pulse Resp SpO2   03/02/24 0845 (!) 143/65 98.5 °F (36.9 °C) Oral 83 18 91 %   03/01/24 2141 133/63 98.6 °F (37 °C) Oral 89 16 93 %   03/01/24 1601 -- -- -- -- 18 --   03/01/24 1531 -- -- -- -- 18 --   03/01/24 1154 124/67 98.4 °F (36.9 °C) Oral 84 16 95 %       General: alert, appears stated age, cooperative, and no distress   Wound: Wound clean and dry no evidence of infection., No Erythema, No Drainage, and Positive for Edema   Motion: Painful range of Motion in affected extremity   DVT Exam: No evidence of DVT seen on physical exam.  No cords or calf tenderness.  No significant calf/ankle edema.     Additional exam: Patient seen sitting up in chair at time of interview  Leg lengths equal, rotational alignment neutral  Thigh moderate swelling as expected, compartments compressible  EHL, FHL, gastroc, and anterior tibialis motor intact  Sensation intact to light touch  DP and PT pulses 2+      Data Review  CBC:   Lab Results   Component Value Date/Time    WBC 8.2 02/29/2024 10:21 AM    RBC 3.44 02/29/2024 10:21 AM    HGB 9.0 03/01/2024 06:44 AM    HCT 26.9 03/01/2024 06:44 AM     02/29/2024 10:21 AM       Renal:   Lab Results   Component Value Date/Time     02/29/2024 10:21 AM    K 3.8 02/29/2024 10:21 AM    K 4.3 02/28/2024 07:06 AM     02/29/2024 10:21 AM    CO2 25 02/29/2024 10:21 AM    BUN 13 02/29/2024 10:21 AM    CREATININE 0.7 02/29/2024 10:21 AM    GLUCOSE 106 02/29/2024 10:21 AM    CALCIUM 8.2 02/29/2024 10:21 AM          Assessment:     Post op periprosthetic fracture distal to implant stem, s/p Right femur ORIF, POD 1  DOF 2/29/24 by Dr Courtney   right proximal femur intramedullary nailing, POD 2.   DOS 2/28/24 by  
  Hospital Medicine Progress Note      Date of Admission: 2/28/2024  Hospital Day: 5    Chief Admission Complaint:  Fall     Subjective: Patient is in hospital bed awake and alert. No new issues or complaints today. States she is OK. Pain controlled. Spoke with nursing staff and was informed of no acute issues overnight.      Presenting Admission History:       88 y.o. female who presented to [facility] with fracture s/p fall.  PMHx significant for Alzheimer's Disease.     Patient is not best historian.  Daughter at bedside provided most of the history.  It was stated that patient has been at [facility]/memory care unit for the past 2 weeks and has had 2 falls.  It was stated that patient has had multiple falls at home prior to admission to memory care unit in addition to having multiple fractures over the past year.  When patient had 2 falls at [employer] the first fall she fell on her bottom but did not sustain a fracture at that time.  Second fall she was standing and unsure of how she fell.  Patient did hit her head.  Patient not on oral anticoagulants or aspirin.  It was at this time the patient will be taken to the emergency department further evaluation and treatment.     While in the emergency department patient was found to be normotensive with a heart rate of 76, respiratory rate 15 satting at 96% on room air.  Sodium 135.  Hemoglobin 11.2.  MCV 92.4.  B12 182.  GI and liver testing unremarkable.  EKG showed sinus rhythm with PACs.  Chest x-ray showed no acute process.  X-ray of hip showed intertrochanteric left hip fracture.  CT head showed 2 mm posterior parafalcine subdural hematoma.  Repeat CT showed no enlargement of hematoma.  CT hip showed acute comminuted displaced mildly impacted and intertrochanteric fracture of the right hip.    Assessment/Plan:      Current Principal Problem:  <principal problem not specified>    Hip Fracture Requiring Operative Repair, Closed   -Status post fall, 
  Physician Progress Note      PATIENT:               JENNIFER TONG  CSN #:                  [id number]  :                       1935  ADMIT DATE:       2024 6:57 AM  DISCH DATE:  RESPONDING  PROVIDER #:        Wilman Sanches DO          QUERY TEXT:    Patient admitted with right hip fracture. Noted documentation of subdural   hematoma as well as a fall. If possible, please document in progress notes and   discharge summary if you are treating/evaluating any of the following:    The medical record reflects the following:  Risk Factors: 88 year old female w/ right hip fracture s/p fall  Clinical Indicators:  ED provider note- Patient reports that she did pass   out and fall and strike her head this morning which prompted her   presentation.  Ortho plan of care- Patient also found to have a small   subdural hematoma from the fall.  Treatment: Imaging, neuro consult, neuro checks q4h  Options provided:  -- Traumatic subdural hematoma with LOC unknown  -- Traumatic subdural hematoma without LOC  -- Traumatic subdural hematoma with LOC, specify duration of time if known.  -- Nontraumatic subdural hematoma  -- Other - I will add my own diagnosis  -- Disagree - Not applicable / Not valid  -- Disagree - Clinically unable to determine / Unknown  -- Refer to Clinical Documentation Reviewer    PROVIDER RESPONSE TEXT:    This patient has a traumatic subdural hematoma with LOC unknown.    Query created by: Kimberly Wolff on 2024 10:53 AM      Electronically signed by:  Wilman Sanches DO 2024 11:42 AM          
4 Eyes Skin Assessment     NAME:  Sonia Benavides  YOB: 1935  MEDICAL RECORD NUMBER:  [id number]    The patient is being assessed for  Post-Op Surgical    I agree that at least one RN has performed a thorough Head to Toe Skin Assessment on the patient. ALL assessment sites listed below have been assessed.      Areas assessed by both nurses:    Head, Face, Ears, Shoulders, Back, Chest, Arms, Elbows, Hands, Sacrum. Buttock, Coccyx, Ischium, and Legs. Feet and Heels    Scattered ecchymosis        Does the Patient have a Wound? No noted wound(s)       Franc Prevention initiated by RN: No  Wound Care Orders initiated by RN: No    Pressure Injury (Stage 3,4, Unstageable, DTI, NWPT, and Complex wounds) if present, place Wound referral order by RN under : No    New Ostomies, if present place, Ostomy referral order under : No     Nurse 1 eSignature: Electronically signed by Nyla Nunez RN on 2/28/24 at 6:47 PM EST    **SHARE this note so that the co-signing nurse can place an eSignature**    Nurse 2 eSignature: Electronically signed by Yu Holden RN on 2/28/24 at 6:49 PM EST    
A: Brought patients daughter back to see patient, call light is in reach.  
A: Updated RN about patient pulling out iv in left forearm and applying dressing and updated daughter to room number.  
Attempted to call report to [place] [facility], tried 3x. First time phone rang busy, second time it rang and then just beeped, third time rang busy.   
D: Daughter states that patient is sleepy and would like a call later with her room number, call light is in reach.  
D: Patient here from OR via bed, taken to bay 5, all current drips, treatments, skin issues, and plan of care were reviewed by both RN, patient transferred in stable condition, patient is alert, awake, and oriented to person only, call light is in reach, bed exit alarm is on for added safety. A: Assessment completed and documented, patient has advanced dementia and is unable to understand discussion of plan of care.   
D: Patient here from OR via bed, taken to bay 7, all current drips, treatments, skin issues, and plan of care were reviewed by both RN, patient transferred in stable condition, patient opens eyes when spoken to but falls back to sleepy quickly, call light is in reach. A: Assessment completed and documented, patient will need reinforcement for plan of care due to dementia.  
Daughter stated that pt has lost R hearing aide. Checked bed sheets that were removed by therapy this am, no hearing aide found. Checked in bed/under mattress, inside bedside table, around room and bathroom. No hearing aide found. Manager made aware.  
Occupational Therapy  Facility/Department: [facility] C5 - MED SURG/ORTHO  Treatment Note    Name: Sonia Benavides  : 1935  MRN: [id number]  Date of Service: 3/3/2024    Discharge Recommendations:  Subacute/Skilled Nursing Facility  Therapy discharge recommendations take into account each patient's current medical complexities and are subject to input/oversight from the patient's healthcare team.   Barriers to Home Discharge:     [x] Unable to transfer, ambulate, or propel wheelchair household distances without assist   [x] Limited available assist at home upon discharge     If pt is unable to be seen after this session, please let this note serve as discharge summary.  Please see case management note for discharge disposition.  Thank you.           Patient Diagnosis(es): The primary encounter diagnosis was Closed displaced intertrochanteric fracture of right femur, initial encounter ([facility]). Diagnoses of Fall, initial encounter and Urinary tract infection without hematuria, site unspecified were also pertinent to this visit.  Past Medical History:  has a past medical history of Skin cancer.  Past Surgical History:  has a past surgical history that includes Femur Surgery (Right, 2024) and Fibula Fracture Surgery (Right, 2024).           Assessment   Performance deficits / Impairments: Decreased functional mobility ;Decreased ADL status;Decreased strength;Decreased safe awareness;Decreased cognition;Decreased balance  Assessment: pt admitted s/p fall and now s/p ORIF R femoral shaft fx, permitted RLE WBAT. Pt resides at [facility] and has hx of dementia. Pt required mod A  x2 for bed mobility and min/mod x2 for sit<-->stand with walker. Total assist needed for LE ADLs. Recommend SNF for skilled OT to improve ADLs and mobility to baseline. Cont OT.  REQUIRES OT FOLLOW-UP: Yes  Activity Tolerance  Activity Tolerance: Patient Tolerated treatment well  Activity Tolerance Comments: vitals stable on RA    
Occupational/Physical Therapy  Received OT/PT orders. Per chart review and discussion with RN, pt had IM nailing of femur on 2/28 and post-op x-ray showed a spiral fracture of the midshaft of femur distal to the nail. Will sign-off on OT/PT at this time, per RN pt may return to OR this date. Please re-order when pt is medically appropriate. Thank you.    Marisela Mayer, OT  Yuli Marie, PT  
Patient IV and tele removed, no complications. All discharge instructions sent with transport at time of departure. All belongings sent with patient and transport at time of discharge. Report attempted to be called to facility, no answer again. ARIS and paperwork went with transport at time of DC.    
Patient admitted to PACU bay 9 in preparation for surgery, VSS. Consent confirmed. IV inserted in place in RAC, LR infusing. Belongings in PACU. NPO since midnight. Family at bedside, phone number in system for text updates, call light within reach.     
Physical Therapy  Facility/Department: [facility] C5 - MED SURG/ORTHO  Daily Treatment Note  NAME: Sonia Benavides  : 1935  MRN: [id number]    Date of Service: 3/2/2024    Discharge Recommendations:  Subacute/Skilled Nursing Facility   PT Equipment Recommendations  Other: defer to next level of care.    Therapy discharge recommendations take into account each patient's current medical complexities and are subject to input/oversight from the patient's healthcare team.   Barriers to Home Discharge:   [] Steps to access home entry or bed/bath:   [x] Unable to transfer, ambulate, or propel wheelchair household distances without assist   [x] Unable to perform ADLs without assist   [x] Limited available assist at home upon discharge    [] Patient or family requests d/c to post-acute facility    [x] Poor cognition/safety awareness for d/c to home alone    [] Unable to maintain ordered weight bearing status    [x] Patient with salient signs of long-standing immobility   [] Other: pt with recent orthopedic surgery, pt with notable balance deficits and high fall risk,    Patient Diagnosis(es): The primary encounter diagnosis was Closed displaced intertrochanteric fracture of right femur, initial encounter ([facility]). Diagnoses of Fall, initial encounter and Urinary tract infection without hematuria, site unspecified were also pertinent to this visit.    Assessment   Assessment: Pt seen in conjunction with OT to maximize pt safety and mobility and safely assess pt maximal level of mobility.  pt demonstrates MAX A X2 for bed mobility, MIN A X2 for functional transfers with RW.  pt performs several exercises in bed, several exercises in seated at EOB.  pt limited by [place] and cognition, requires several prompts to follow commands.  Pt demonstrates decreased strength, endurance, mobility, activity tolerance, balance, and functional capacity below their baseline and would benefit from skilled PT to address the above stated deficits 
Physical Therapy  Facility/Department: [place] [address] - MED SURG/ORTHO  Physical Therapy Initial Assessment    Name: Sonia Benavides  : 1935  MRN: [id number]  Date of Service: 3/1/2024    Discharge Recommendations:  Subacute/Skilled Nursing Facility   PT Equipment Recommendations  Other: TBD per accepting facility    Therapy discharge recommendations take into account each patient's current medical complexities and are subject to input/oversight from the patient's healthcare team.   Barriers to Home Discharge:   [] Steps to access home entry or bed/bath:   [x] Unable to transfer, ambulate, or propel wheelchair household distances without assist   [x] Limited available assist at home upon discharge    [] Patient or family requests d/c to post-acute facility    [x] Poor cognition/safety awareness for d/c to home alone    []Unable to maintain ordered weight bearing status    [] Patient with salient signs of long-standing immobility   [x] Patient is at risk for falls due to: cognition, poor balance, cognition, h/o recent falls, pain   [] Other:    If pt is unable to be seen after this session, please let this note serve as discharge summary.  Please see case management note for discharge disposition.  Thank you.        Patient Diagnosis(es): The primary encounter diagnosis was Closed displaced intertrochanteric fracture of right femur, initial encounter ([facility]). Diagnoses of Fall, initial encounter and Urinary tract infection without hematuria, site unspecified were also pertinent to this visit.  Past Medical History:  has a past medical history of Skin cancer.  Past Surgical History:  has a past surgical history that includes Femur Surgery (Right, 2024) and Fibula Fracture Surgery (Right, 2024).    Assessment   Body Structures, Functions, Activity Limitations Requiring Skilled Therapeutic Intervention: Decreased functional mobility ;Decreased strength;Decreased safe awareness;Decreased cognition;Decreased 
Physical Therapy  Facility/Department: [facility] C5 - MED SURG/ORTHO  Daily Treatment Note  NAME: Sonia Benavides  : 1935  MRN: [id number]    Date of Service: 3/3/2024    Discharge Recommendations:  Subacute/Skilled Nursing Facility   PT Equipment Recommendations  Equipment Needed: No  Other: defer to next level of care.    Therapy discharge recommendations take into account each patient's current medical complexities and are subject to input/oversight from the patient's healthcare team.   Barriers to Home Discharge:   [] Steps to access home entry or bed/bath:   [x] Unable to transfer, ambulate, or propel wheelchair household distances without assist   [x] Unable to perform ADLs without assist   [] Limited available assist at home upon discharge    [] Patient or family requests d/c to post-acute facility    [x] Poor cognition/safety awareness for d/c to home alone    [] Unable to maintain ordered weight bearing status    [] Patient with salient signs of long-standing immobility   [] Other: pt with recent orthopedic surgery, high fall risk, unsteady on her feet and requires assist for all mobility.    Patient Diagnosis(es): The primary encounter diagnosis was Closed displaced intertrochanteric fracture of right femur, initial encounter ([facility]). Diagnoses of Fall, initial encounter and Urinary tract infection without hematuria, site unspecified were also pertinent to this visit.    Assessment   Assessment: Pt seen in conjunction with OT to maximize pt safety and mobility and safely assess pt maximal level of mobility.  pt demonstrates MAX A X2 for bed mobility, MIN A-MOD A X1 for functional transfers with RW, MIN A for ambulation up to 15' with RW.  pt performs several exercises in bed, several exercises in chair.  pt transfers bed>chair, performs several exercises in seated, transfers chair>bed, and then walks around the bed for ambulation training.  pt limited by [place] and cognition, requires several prompts to 
Pt continues to rest with eyes closed. Respitations even and unlabored. RLE surgical dressings  intact x2, no drainage. Pedal pulse +3. Brisk cap refill. Ice pack applied to RLE. IV fluids infusing to PIV in R arm, wrapped with ace for protection of access. Bed alarm set. Video monitoring in place   
Pt yelling that \"my hip is killing me\" RN spoke with ortho PA to get pt higher dosage of pain medication. RN attempted to provide scheduled pain medication and PRN medication. Pt yelling \"this lady is trying to poison me please help\". RN attempted again to educate pt that this is tylenol and will help with her hip discomfort. Pt ripped water cup in half in anger, then yelled that it was my fault it was spilling on her.   
pt found in bed, agreeable to PT treatment, cleared for treatment by RN.  Pain: pt denies pain at rest, reports pain with transfers and bed mobility, does not formally rate.  Orientation  Orientation Level: Oriented to person;Disoriented to place;Disoriented to time;Disoriented to situation  Cognition  Overall Cognitive Status: Exceptions  Arousal/Alertness: Delayed responses to stimuli  Following Commands: Follows one step commands with increased time  Attention Span: Attends with cues to redirect  Memory: Decreased recall of precautions  Safety Judgement: Decreased awareness of need for assistance;Decreased awareness of need for safety  Problem Solving: Decreased awareness of errors  Insights: Decreased awareness of deficits  Initiation: Requires cues for some  Sequencing: Requires cues for some  Cognition Comment: Hx dementia, but pleasant, cooperative during therapy session;     Objective   Vitals  Pulse: 76  BP: (!) 148/67  MAP (Calculated): 94  SpO2: 96 %  Bed Mobility Training  Bed Mobility Training: Yes  Interventions: Safety awareness training;Verbal cues;Tactile cues  Supine to Sit: Moderate assistance;Assist X2;Additional time;Adaptive equipment (to L with HOB ~ 30*)  Scooting: Contact-guard assistance;Minimum assistance;Additional time (to EOB)  Duration: 5  Balance  Sitting: Impaired  Sitting - Static: Fair (occasional)  Sitting - Dynamic: Fair (occasional)  Standing: Impaired  Standing - Static: Fair;Constant support (RW)  Standing - Dynamic: Constant support;Fair (RW)  Transfer Training  Transfer Training: Yes  Interventions: Verbal cues;Tactile cues;Safety awareness training  Sit to Stand: Minimum assistance;Moderate assistance;Assist X2;Additional time;Adaptive equipment (2 stands min A of 1, 1 stand mod A of 2)  Stand to Sit: Minimum assistance;Assist X1;Assist X2;Additional time;Adaptive equipment (RW)  Bed to Chair: Minimum assistance;Assist X2;Additional time;Adaptive equipment (RW)  Gait 
    Plan:      1:  plan for open reduction internal fixation of periprosthetic fracutre by Dr Courtney at 1700. Pt is NPO, orders placed for consent and abx on call to OR  2:  Deep venous thrombosis prophylaxis to start tomorrow, recommend lovenox daily for 6 weeks post op, but will defer final decision to hospitalist/neuro due to subdural hematoma  3:  Continue Pain Control  4:  PT/OT to be ordered post op tomorrow  5:  Two doses IV ancef completed post op   6:  Discharge pending progress    RAY Nava  
[x]No    ------------------------------------------------------------------------------------------------------------------------------------------------------------------------    MDM    [x] High (any 2)    A. Problems (any 1)  [x] Acute/Chronic Illness/injury posing threat to life or bodily function:  femur fracture  [] Severe exacerbation of chronic illness:    ---------------------------------------------------------------------  B. Risk of Treatment (any 1)   [] Drugs/treatments that require intensive monitoring for toxicity include:    [] Change in code status:    [] Decision to escalate care:    [x] Major surgery/procedure with associated risk factors:  surgery of femur  ----------------------------------------------------------------------  C. Data (any 2)  [] Discussed current management and discharge planning options with Case Management.  [x] Discussed management of the case with:  nursing staff  [] Telemetry personally reviewed and interpreted as documented above    [] Imaging personally reviewed and interpreted, includes:    [x] Data Review (any 3)  [] Collateral history obtained from:    [x] All available Consultant notes from yesterday/today were reviewed  [x] All current labs were reviewed and interpreted for clinical significance   [x] Appropriate follow-up labs were ordered    Medications:  Personally reviewed in detail in conjunction w/ labs as documented for evidence of drug toxicity.     Infusion Medications    sodium chloride 75 mL/hr at 02/29/24 2139    sodium chloride      sodium chloride       Scheduled Medications    acetaminophen  650 mg Oral Q6H    methocarbamol  500 mg Oral 4x Daily    OLANZapine  5 mg Oral Nightly    sodium chloride flush  5-40 mL IntraVENous 2 times per day    enoxaparin  40 mg SubCUTAneous Daily    sodium chloride flush  5-40 mL IntraVENous 2 times per day     PRN Meds: oxyCODONE **OR** oxyCODONE, sodium chloride flush, sodium chloride, sodium chloride flush, sodium 
equipment (RW)  Bed to Chair: Minimum assistance;Assist X2;Additional time;Adaptive equipment (RW)  Gait Training  Gait Training: Yes  Right Side Weight Bearing: As tolerated  Gait  Gait Training: Yes  Overall Level of Assistance: Minimum assistance;Assist X1;Assist X2;Additional time;Adaptive equipment (RW)  Distance (ft): 5 Feet (+ 10' + 10')  Assistive Device: Walker, rolling;Gait belt  Interventions: Manual cues;Safety awareness training;Tactile cues;Verbal cues  Step Length: Right shortened;Left shortened  Gait Abnormalities: Decreased step clearance;Trunk sway increased;Antalgic;Path deviations     ADL  Toileting: Dependent/Total  Toileting Skilled Clinical Factors: purewick  Skin Care: Bath wipes  OT Exercises  Exercise Treatment: BUE AROM seated in chair - elbow flexion, chest press, shoulder press     Safety Devices  Type of Devices: Call light within reach;Gait belt;Nurse notified;Heels elevated for pressure relief;Patient at risk for falls;Telesitter in use;Left in chair;Chair alarm in place  Restraints  Restraints Initially in Place: No     Patient Education  Education Given To: Patient  Education Provided: Role of Therapy;Plan of Care;Precautions;Transfer Training;Equipment;Orientation  Education Provided Comments: disease specific: importance of use of RED/nurse call light for assist with ADL needs, positioning, transfers;  Education Method: Verbal;Demonstration  Barriers to Learning: Hearing;Cognition  Education Outcome: Unable to demonstrate understanding;Continued education needed  Disease Specific Education: Pt educated on importance of OOB mobility, prevention of complications of bedrest, and general safety during hospitalization. Pt verbalized understanding    Goals  Short Term Goals  Time Frame for Short Term Goals: 1 week (3/8) unless noted - ALL GOALS ONGOING 3/04  Short Term Goal 1: Perform functional transfer with min x1 and AD;  Short Term Goal 2: Perform 2-3 grooming tasks with SBA while 
limits  Tone: Normal    ADL  Grooming: CGA(seated EOB to brush hair & brush teeth & wash face & hands)  Toileting: Dependent/Total (purewick)         Activity Tolerance  Activity Tolerance: Patient limited by pain;Patient limited by endurance;Treatment limited secondary to decreased cognition  Bed mobility  Supine to Sit: 2 Person assistance (max assist of 2 to Left)  Sit to Supine: 2 Person assistance (max assist of 2)  Scooting: Contact guard assistance (EOB)  Bed Mobility Comments: min assist to CGA sitting EOB for 25 minutes;  Transfers  Stand Step Transfers: 2 Person assistance (mod assist of 2 to side step to Left to HOB with walker)  Sit to stand: 2 Person assistance (min assist of 2 with mod cues for safe hand placement)  Stand to sit: 2 Person assistance (min assist of 2)     Cognition  Overall Cognitive Status: Exceptions  Arousal/Alertness: Delayed responses to stimuli ([place])  Following Commands: Follows one step commands with increased time ([place])  Attention Span: Attends with cues to redirect  Memory: Decreased recall of precautions;Decreased recall of recent events;Decreased short term memory  Safety Judgement: Decreased awareness of need for safety;Decreased awareness of need for assistance  Insights: Decreased awareness of deficits  Initiation: Requires cues for some  Sequencing: Requires cues for some  Cognition Comment: Hx dementia, but pleasant, cooperative during therapy session;  Orientation  Overall Orientation Status: Impaired  Orientation Level: Oriented to person               Exercise Treatment: BUE AROM seated EOB  Hand flex/ext: x  10  Reps  Elbow flex/ext:  x  10  Reps  Shld flex/ext:  x  5  Reps           Education Given To: Patient  Education Provided: Role of Therapy;Plan of Care;Precautions  Education Provided Comments: disease specific: importance of use of RED/nurse call light for assist with ADL needs, positioning, transfers;  Education Method: Verbal;Demonstration  Barriers to 
**OR** ondansetron, morphine     Labs:  Personally reviewed and interpreted for clinical significance.     Recent Labs     02/28/24  0706 02/29/24  1021   WBC 10.8 8.2   HGB 11.2* 10.5*   HCT 33.5* 32.0*    277     Recent Labs     02/28/24  0706 02/29/24  1021   * 137   K 4.3 3.8   CL 99 102   CO2 27 25   BUN 14 13   CREATININE 0.7 0.7   CALCIUM 8.8 8.2*     No results for input(s): \"PROBNP\", \"TROPHS\" in the last 72 hours.  No results for input(s): \"LABA1C\" in the last 72 hours.  Recent Labs     02/28/24  0706   AST 15   ALT 9*   BILITOT 0.7   ALKPHOS 49     No results for input(s): \"INR\", \"LACTA\", \"TSH\" in the last 72 hours.    Urine Cultures: No results found for: \"LABURIN\"  Blood Cultures: No results found for: \"BC\"  No results found for: \"BLOODCULT2\"  Organism: No results found for: \"ORG\"      Venkat Cortez, DO    
chloride flush, sodium chloride, potassium chloride **OR** potassium alternative oral replacement **OR** potassium chloride, magnesium sulfate, ondansetron **OR** ondansetron     Labs:  Personally reviewed and interpreted for clinical significance.     Recent Labs     02/29/24  1021 03/01/24  0644   WBC 8.2  --    HGB 10.5* 9.0*   HCT 32.0* 26.9*     --        Recent Labs     02/29/24  1021      K 3.8      CO2 25   BUN 13   CREATININE 0.7   CALCIUM 8.2*       No results for input(s): \"PROBNP\", \"TROPHS\" in the last 72 hours.  No results for input(s): \"LABA1C\" in the last 72 hours.  No results for input(s): \"AST\", \"ALT\", \"BILIDIR\", \"BILITOT\", \"ALKPHOS\" in the last 72 hours.    No results for input(s): \"INR\", \"LACTA\", \"TSH\" in the last 72 hours.    Urine Cultures: No results found for: \"LABURIN\"  Blood Cultures: No results found for: \"BC\"  No results found for: \"BLOODCULT2\"  Organism: No results found for: \"ORG\"      Venkat Cortez,     
general safety during hospitalization. Pt verbalized understanding                      AM-PAC - ADL  AM-PAC Daily Activity - Inpatient   How much help is needed for putting on and taking off regular lower body clothing?: Total  How much help is needed for bathing (which includes washing, rinsing, drying)?: Total  How much help is needed for toileting (which includes using toilet, bedpan, or urinal)?: Total  How much help is needed for putting on and taking off regular upper body clothing?: A Lot  How much help is needed for taking care of personal grooming?: A Lot  How much help for eating meals?: A Little  AM-[facility] Inpatient Daily Activity Raw Score: 10  AM-PAC Inpatient ADL T-Scale Score : 27.31  ADL Inpatient CMS 0-100% Score: 74.7  ADL Inpatient CMS G-Code Modifier : CL    Goals  Short Term Goals  Time Frame for Short Term Goals: 1 week (3/8) unless noted  Short Term Goal 1: Perform functional transfer with min x1 and AD  Short Term Goal 2: Perform 2-3 grooming tasks with SBA while seated by 3/6  Short Term Goal 3: Perform toileting with mod A  Patient Goals   Patient goals : Pt did not provide     Therapy Time   Individual Concurrent Group Co-treatment   Time In 0942         Time Out 1015         Minutes 33         Timed Code Treatment Minutes: 23 Minutes (10 min eval)     Lesley Mcelroy OT

## 2024-03-04 NOTE — PLAN OF CARE
Problem: Discharge Planning  Goal: Discharge to home or other facility with appropriate resources  3/3/2024 2324 by Kathie Avendano RN  Outcome: Progressing  Flowsheets (Taken 3/3/2024 2013)  Discharge to home or other facility with appropriate resources:   Identify barriers to discharge with patient and caregiver   Arrange for needed discharge resources and transportation as appropriate  3/3/2024 1015 by Ana Baxter RN  Outcome: Progressing     Problem: Pain  Goal: Verbalizes/displays adequate comfort level or baseline comfort level  3/3/2024 2324 by Kathie Avendano RN  Outcome: Progressing  3/3/2024 1015 by Ana Baxter RN  Outcome: Progressing     Problem: Safety - Adult  Goal: Free from fall injury  3/3/2024 2324 by Kathie Avendano RN  Outcome: Progressing  3/3/2024 1015 by Ana Baxter RN  Outcome: Progressing     Problem: Skin/Tissue Integrity  Goal: Absence of new skin breakdown  Description: 1.  Monitor for areas of redness and/or skin breakdown  2.  Assess vascular access sites hourly  3.  Every 4-6 hours minimum:  Change oxygen saturation probe site  4.  Every 4-6 hours:  If on nasal continuous positive airway pressure, respiratory therapy assess nares and determine need for appliance change or resting period.  3/3/2024 2324 by Kathie Avendano RN  Outcome: Progressing  3/3/2024 1015 by Ana Baxter RN  Outcome: Progressing     Problem: ABCDS Injury Assessment  Goal: Absence of physical injury  3/3/2024 2324 by Kathie Avendano RN  Outcome: Progressing  Flowsheets (Taken 3/3/2024 2013)  Absence of Physical Injury: Implement safety measures based on patient assessment  3/3/2024 1015 by Ana Baxter RN  Outcome: Progressing     Problem: Confusion  Goal: Confusion, delirium, dementia, or psychosis is improved or at baseline  Description: INTERVENTIONS:  1. Assess for possible contributors to thought disturbance, including medications, impaired

## 2024-03-04 NOTE — DISCHARGE SUMMARY
Hospital Medicine Discharge Summary    Patient: Sonia Benavides   : 1935     Admit Date: 2024   Discharge Date:   24    Disposition:  []Home   []HHC  []SNF  [x]ECF  []Acute Rehab  []LTAC  []Hospice  Code status:  []Full  []DNR/CCA  [x]Limited (DNR/CCA with Do Not Intubate)  []DNRCC  Condition at Discharge: Stable  Primary Care Provider: No primary care provider on file.    Admitting Provider: Venkat Cortez DO  Discharge Provider: Venkat Cortez DO     Discharge Diagnoses:      Active Hospital Problems    Diagnosis     Closed displaced spiral fracture of shaft of right femur (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) [S72.001A]     Closed displaced intertrochanteric fracture of right femur ([facility]) [S72.141A]        Presenting Admission History:      88 y.o. female who presented to [facility] with fracture s/p fall.  PMHx significant for Alzheimer's Disease.     Patient is not best historian.  Daughter at bedside provided most of the history.  It was stated that patient has been at [facility]/memory care unit for the past 2 weeks and has had 2 falls.  It was stated that patient has had multiple falls at home prior to admission to memory care unit in addition to having multiple fractures over the past year.  When patient had 2 falls at [employer] the first fall she fell on her bottom but did not sustain a fracture at that time.  Second fall she was standing and unsure of how she fell.  Patient did hit her head.  Patient not on oral anticoagulants or aspirin.  It was at this time the patient will be taken to the emergency department further evaluation and treatment.     While in the emergency department patient was found to be normotensive with a heart rate of 76, respiratory rate 15 satting at 96% on room air.  Sodium 135.  Hemoglobin 11.2.  MCV 92.4.

## 2024-03-04 NOTE — CARE COORDINATION
Writer placed call to [facility] and they can take pts w/c during transport. Electronically signed by SONALI WOLF RN on 3/4/2024 at 2:10 PM  l

## 2024-03-04 NOTE — PLAN OF CARE
Problem: Discharge Planning  Goal: Discharge to home or other facility with appropriate resources  3/4/2024 1038 by Kallie Eli RN  Outcome: Progressing  3/3/2024 2324 by Kathie Avendano RN  Outcome: Progressing  Flowsheets (Taken 3/3/2024 2013)  Discharge to home or other facility with appropriate resources:   Identify barriers to discharge with patient and caregiver   Arrange for needed discharge resources and transportation as appropriate     Problem: Pain  Goal: Verbalizes/displays adequate comfort level or baseline comfort level  3/4/2024 1038 by Kallie Eli RN  Outcome: Progressing  3/3/2024 2324 by Kathie Avendano RN  Outcome: Progressing     Problem: Safety - Adult  Goal: Free from fall injury  3/4/2024 1038 by Kallie Eli RN  Outcome: Progressing  3/3/2024 2324 by Kathie Avendano RN  Outcome: Progressing     Problem: Skin/Tissue Integrity  Goal: Absence of new skin breakdown  Description: 1.  Monitor for areas of redness and/or skin breakdown  2.  Assess vascular access sites hourly  3.  Every 4-6 hours minimum:  Change oxygen saturation probe site  4.  Every 4-6 hours:  If on nasal continuous positive airway pressure, respiratory therapy assess nares and determine need for appliance change or resting period.  3/4/2024 1038 by Kallie Eli RN  Outcome: Progressing  3/3/2024 2324 by Kathie Avendano RN  Outcome: Progressing     Problem: ABCDS Injury Assessment  Goal: Absence of physical injury  3/4/2024 1038 by Kallie Eli RN  Outcome: Progressing  3/3/2024 2324 by Kathie Avendano RN  Outcome: Progressing  Flowsheets (Taken 3/3/2024 2013)  Absence of Physical Injury: Implement safety measures based on patient assessment     Problem: Confusion  Goal: Confusion, delirium, dementia, or psychosis is improved or at baseline  Description: INTERVENTIONS:  1. Assess for possible contributors to thought disturbance, including medications, impaired vision or hearing, underlying

## 2024-03-04 NOTE — CARE COORDINATION
Writer benny wong/Selena at [facility], will be here around 1040 to witness PT/OT session. Electronically signed by SONALI WOFL RN on 3/4/2024 at 10:11 AM

## 2024-03-04 NOTE — CARE COORDINATION
CASE MANAGEMENT DISCHARGE SUMMARY      Discharge to: [facility]     Precertification completed: n/a  Hospital Exemption Notification (HENS) completed: n/a    IMM given: 3/4/24    New Durable Medical Equipment ordered/agency: w/c w/elevated leg rest through [employer]    Transportation:    Medical Transport explained to pt/family. Pt/family voice no agency preference.        Confirmed discharge plan with:     Patient: yes     Family:  Dtr at bedside      Facility/Agency, name:  [facility] faxed and confirmed w/selena    Phone number for report to facility: [phone] (Selena)     RN, name: Kallie     Note: Discharging nurse to complete [facility], reconcile [facility], and place final copy with patient's discharge packet. RN to ensure that written prescriptions for  Level II medications are sent with patient to the facility as per protocol.

## 2024-03-09 ENCOUNTER — APPOINTMENT (OUTPATIENT)
Dept: CT IMAGING | Age: 89
End: 2024-03-09
Payer: MEDICARE

## 2024-03-09 ENCOUNTER — APPOINTMENT (OUTPATIENT)
Dept: GENERAL RADIOLOGY | Age: 89
End: 2024-03-09
Payer: MEDICARE

## 2024-03-09 ENCOUNTER — HOSPITAL ENCOUNTER (EMERGENCY)
Age: 89
Discharge: HOME | End: 2024-03-09
Attending: EMERGENCY MEDICINE
Payer: MEDICARE

## 2024-03-09 VITALS
HEIGHT: 60 IN | BODY MASS INDEX: 28.47 KG/M2 | TEMPERATURE: 97.9 F | SYSTOLIC BLOOD PRESSURE: 130 MMHG | OXYGEN SATURATION: 95 % | RESPIRATION RATE: 18 BRPM | HEART RATE: 87 BPM | WEIGHT: 145 LBS | DIASTOLIC BLOOD PRESSURE: 59 MMHG
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
## 2024-03-09 DIAGNOSIS — S01.01XA LACERATION OF SCALP, INITIAL ENCOUNTER: Primary | ICD-10-CM

## 2024-03-09 PROCEDURE — 12001 RPR S/N/AX/GEN/TRNK 2.5CM/<: CPT

## 2024-03-09 PROCEDURE — 99284 EMERGENCY DEPT VISIT MOD MDM: CPT

## 2024-03-09 PROCEDURE — 73502 X-RAY EXAM HIP UNI 2-3 VIEWS: CPT

## 2024-03-09 PROCEDURE — 70450 CT HEAD/BRAIN W/O DYE: CPT

## 2024-03-09 PROCEDURE — 72125 CT NECK SPINE W/O DYE: CPT

## 2024-03-09 ASSESSMENT — PAIN - FUNCTIONAL ASSESSMENT: PAIN_FUNCTIONAL_ASSESSMENT: 0-10

## 2024-03-09 ASSESSMENT — PAIN SCALES - GENERAL: PAINLEVEL_OUTOF10: 0

## 2024-03-09 NOTE — ED NOTES
Report given to [facility], questions answered, care transferred. Pt off unit via stretcher to [place] [facility]. VSS

## 2024-03-09 NOTE — ED NOTES
Wound care performed with CHG soap and 0.9% Na Cl, 4X4 guaze; patient tolerated. Notified provider.

## 2024-03-09 NOTE — ED NOTES
Report called to [place] [facility] 0432. Pt stable and transport being arranged to take her home.

## 2024-03-09 NOTE — ED PROVIDER NOTES
2/13/24 3/14/24  Ziyad Dumont MD   memantine (NAMENDA) 5 MG tablet Take 1 tablet by mouth 2 times daily 2/13/24   Ziyad Dumont MD   vitamin B-12 1000 MCG tablet Take 1 tablet by mouth daily 2/14/24   Ziyad Dumont MD       Social history:  reports that she has never smoked. She has never used smokeless tobacco. She reports that she does not currently use alcohol. She reports that she does not use drugs.    Family history:    Family History   Problem Relation Age of Onset    Heart Disease Mother        Exam  ED Triage Vitals   BP Temp Temp Source Pulse Respirations SpO2 Height Weight - Scale   03/09/24 0208 03/09/24 0208 03/09/24 0208 03/09/24 0208 03/09/24 0208 03/09/24 0208 03/09/24 0204 03/09/24 0204   (!) 125/53 98.3 °F (36.8 °C) Oral 80 18 93 % 1.524 m (5') 65.8 kg (145 lb)   Physical Exam  Vitals and nursing note reviewed.   Constitutional:       General: She is not in acute distress.     Appearance: Normal appearance. She is well-developed. She is not diaphoretic.   HENT:      Head: Normocephalic.        Comments: Bleeding controlled PTA  Eyes:      General: Lids are normal. No scleral icterus.        Right eye: No discharge.         Left eye: No discharge.      Conjunctiva/sclera: Conjunctivae normal.      Pupils: Pupils are equal, round, and reactive to light.   Neck:      Trachea: No tracheal deviation.   Cardiovascular:      Rate and Rhythm: Normal rate and regular rhythm.   Pulmonary:      Effort: Pulmonary effort is normal. No respiratory distress.      Breath sounds: Normal breath sounds. No stridor. No wheezing.   Abdominal:      General: There is no distension.      Palpations: Abdomen is soft.      Tenderness: There is no abdominal tenderness. There is no guarding or rebound.   Musculoskeletal:         General: No deformity.      Cervical back: Neck supple. No tenderness.      Thoracic back: No tenderness.      Lumbar back: No tenderness.      Comments: Staples on the  for a visit in 5 days  For wound re-check, For staple removal      I, Rock Mann, am the primary attending of record and contributed the majority of evaluation and treatment of emergent care for this encounter.     Total critical care time is 0 minutes, which excludes separately billable procedures and updating family. Time spent is specifically for management of the presenting complaint and symptoms initially, direct bedside care, reevaluation, review of records, and consultation.  There was a high probability of clinically significant life-threatening deterioration in the patient's condition, which required my urgent intervention.     This chart was generated in part by using Dragon Dictation system and may contain errors related to that system including errors in grammar, punctuation, and spelling, as well as words and phrases that may be inappropriate. If there are any questions or concerns please feel free to contact the dictating provider for clarification.     Rock Mann MD   Acute Care [facility]      Rock Mann MD  03/09/24 0600

## 2024-03-09 NOTE — ED NOTES
RN gave report to [facility]. Report called, questions answered, care transferred. ETA for EMS is 0800

## 2024-03-18 ENCOUNTER — TELEPHONE (OUTPATIENT)
Dept: ORTHOPEDIC SURGERY | Age: 89
End: 2024-03-18

## 2024-03-18 NOTE — TELEPHONE ENCOUNTER
Medical Facility Question     Facility Name: [facility]   Contact Name: CRISTOPHER   Contact Number: [phone]   Request or Information: Cristopher called and she just wanted to know if the patient stitches can be remove at the facility or do she need to come back into the office. She just need a call back her name is Cristopher.

## 2024-04-19 ENCOUNTER — APPOINTMENT (OUTPATIENT)
Dept: CT IMAGING | Age: 89
End: 2024-04-19
Payer: COMMERCIAL

## 2024-04-19 ENCOUNTER — HOSPITAL ENCOUNTER (EMERGENCY)
Age: 89
Discharge: HOME | End: 2024-04-19
Payer: COMMERCIAL

## 2024-04-19 VITALS
DIASTOLIC BLOOD PRESSURE: 74 MMHG | BODY MASS INDEX: 28.47 KG/M2 | HEART RATE: 94 BPM | SYSTOLIC BLOOD PRESSURE: 188 MMHG | WEIGHT: 145 LBS | HEIGHT: 60 IN | OXYGEN SATURATION: 97 % | RESPIRATION RATE: 16 BRPM | TEMPERATURE: 98 F

## 2024-04-19 DIAGNOSIS — S01.01XA LACERATION OF SCALP, INITIAL ENCOUNTER: ICD-10-CM
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
## 2024-04-19 DIAGNOSIS — S09.90XA INJURY OF HEAD, INITIAL ENCOUNTER: ICD-10-CM

## 2024-04-19 PROCEDURE — 6360000002 HC RX W HCPCS: Performed by: PHYSICIAN ASSISTANT

## 2024-04-19 PROCEDURE — 99284 EMERGENCY DEPT VISIT MOD MDM: CPT

## 2024-04-19 PROCEDURE — 12001 RPR S/N/AX/GEN/TRNK 2.5CM/<: CPT

## 2024-04-19 PROCEDURE — 72125 CT NECK SPINE W/O DYE: CPT

## 2024-04-19 PROCEDURE — 70450 CT HEAD/BRAIN W/O DYE: CPT

## 2024-04-19 PROCEDURE — 90715 TDAP VACCINE 7 YRS/> IM: CPT | Performed by: PHYSICIAN ASSISTANT

## 2024-04-19 PROCEDURE — 90471 IMMUNIZATION ADMIN: CPT | Performed by: PHYSICIAN ASSISTANT

## 2024-04-19 RX ADMIN — TETANUS TOXOID, REDUCED DIPHTHERIA TOXOID AND ACELLULAR PERTUSSIS VACCINE, ADSORBED 0.5 ML: 5; 2.5; 8; 8; 2.5 SUSPENSION INTRAMUSCULAR at 17:48

## 2024-04-19 ASSESSMENT — PAIN SCALES - GENERAL: PAINLEVEL_OUTOF10: 3

## 2024-04-19 ASSESSMENT — PAIN - FUNCTIONAL ASSESSMENT: PAIN_FUNCTIONAL_ASSESSMENT: 0-10

## 2024-04-19 NOTE — ED PROVIDER NOTES
Date: 4/19/2024  EXAMINATION: CT OF THE HEAD WITHOUT CONTRAST  4/19/2024 5:54 pm TECHNIQUE: CT of the head was performed without the administration of intravenous contrast. Automated exposure control, iterative reconstruction, and/or weight based adjustment of the mA/kV was utilized to reduce the radiation dose to as low as reasonably achievable. COMPARISON: 03/09/2024 HISTORY: ORDERING SYSTEM PROVIDED HISTORY: fall, head injury TECHNOLOGIST PROVIDED HISTORY: Reason for exam:->fall, head injury Has a \"code stroke\" or \"stroke alert\" been called?->No Decision Support Exception - unselect if not a suspected or confirmed emergency medical condition->Emergency Medical Condition ([place]) Reason for Exam: fall, hit head eval for trauma , cut on alexy of head, pt poor historian--dementia FINDINGS: BRAIN/VENTRICLES: Motion artifact degrades image quality.  There is no acute intracerebral hemorrhage or extra-axial fluid collection.  There is mild cerebral atrophy with mild periventricular, subcortical and deep white matter small vessel ischemic disease. ORBITS: Status post bilateral cataract removal. SINUSES: The visualized paranasal sinuses and mastoid air cells are clear. SOFT TISSUES/SKULL:  The calvarium is intact. There is a small right posterior scalp laceration.     1. No acute intracranial abnormality.          PROCEDURES:       PROCEDURE:  LACERATION REPAIR  Sonia Benavides or their surrogate had an opportunity to ask questions, and the risks, benefits, and alternatives were discussed. The wound was prepped and draped to maintain a sterile field. It was copiously irrigated. It was explored to its depth in a bloodless field with no sign of tendon, nerve, or vascular injury. No foreign bodies were identified. The 2.1 cm wound was closed with 3 staples. There were no complications during the procedure.        CRITICAL CARE TIME:     None        EMERGENCY DEPARTMENT COURSE and DIFFERENTIAL DIAGNOSIS/MDM:   Vitals:    Vitals:

## 2024-12-01 NOTE — GROUP NOTE
Group Therapy Note    Date: 2/2/2024    Group Start Time: 1100  Group End Time: 1145  Group Topic: Music Therapy    [facility]    Ziyad Franks        Group Therapy Note    Group facilitator provided collaborative live music stimuli in line with pt preferences, reminiscence and socialization throughout.    Attendees: 5        Notes:  Present and actively engaged across live music stimuli, repeatedly singing \"[place]\".    Status After Intervention:  Improved    Participation Level: Active Listener and Interactive    Participation Quality: Appropriate      Speech:  normal      Thought Process/Content: Logical      Affective Functioning: Congruent      Mood: euphoric      Level of consciousness:  Alert and Attentive      Response to Learning: Progressing to goal      Endings: None Reported    Modes of Intervention: Support, Socialization, Activity, and Media      Discipline Responsible: Psychoeducational Specialist      Signature:  Ziyad Franks MM, MT-BC     ambulatory

## (undated) DEVICE — GAUZE,SPONGE,4"X4",8PLY,STRL,LF,10/TRAY: Brand: MEDLINE

## (undated) DEVICE — PACK PROCEDURE SURG HIP PIN TROCHANTERIC FEM NAIL CDS

## (undated) DEVICE — GLOVE SURG SZ 75 L12IN FNGR THK79MIL GRN LTX FREE

## (undated) DEVICE — SMARTGOWN BREATHABLE SURGICAL GOWN: Brand: CONVERTORS

## (undated) DEVICE — Device

## (undated) DEVICE — TUBING, SUCTION, 3/16" X 12', STRAIGHT: Brand: MEDLINE

## (undated) DEVICE — GLOVE ORANGE PI 8   MSG9080

## (undated) DEVICE — DRESSING FOAM W4XL12IN AG SIL ADH ANTIMIC POSTOP OPTIFOAM

## (undated) DEVICE — SUTURE VCRL SZ 1 L18IN ABSRB UD L36MM CT-1 1/2 CIR J841D

## (undated) DEVICE — STAPLER SKIN H3.9MM WIRE DIA0.58MM CRWN 6.9MM 35 STPL FIX

## (undated) DEVICE — SUTURE VCRL SZ 0 L36IN ABSRB UD CT-1 L36MM 1/2 CIR TAPR PNT VCP946H

## (undated) DEVICE — SUTURE VCRL + SZ 0 L27IN ABSRB UD L36MM CT-1 1/2 CIR VCPP41D

## (undated) DEVICE — 19 IN KNEE IMMOBILIZER: Brand: DEROYAL

## (undated) DEVICE — GOWN,REINFORCED,POLY,XLARGE: Brand: MEDLINE

## (undated) DEVICE — STAPLER SKIN H3.9MM WIRE DIA0.58MM CRWN 6.9MM 35 STPL ROT

## (undated) DEVICE — GUIDEWIRE ORTH L400MM DIA3.2MM FOR TFN

## (undated) DEVICE — DRESSING FOAM W6.3XL7.9IN TAN SACR SELF ADH MEPILEX BORD

## (undated) DEVICE — SUTURE VCRL SZ 2-0 L18IN ABSRB UD CT-1 L36MM 1/2 CIR J839D

## (undated) DEVICE — 3M™ STERI-DRAPE™ U-DRAPE 1015: Brand: STERI-DRAPE™

## (undated) DEVICE — DRESSING FOAM W4XL4IN SIL FACE BORD ADH PD SUP ABSRB COR

## (undated) DEVICE — GLOVE ORTHO 8   MSG9480

## (undated) DEVICE — GLOVE SURG SZ 8 L12IN FNGR THK79MIL GRN LTX FREE

## (undated) DEVICE — SUTURE MCRYL SZ 4-0 L18IN ABSRB UD L19MM PS-2 3/8 CIR PRIM Y496G

## (undated) DEVICE — SOLUTION IV IRRIG 500ML 0.9% SODIUM CHL 2F7123

## (undated) DEVICE — GLOVE ORTHO 7 1/2   MSG9475

## (undated) DEVICE — DRILL BIT, AO, SCALED: Brand: VARIAX

## (undated) DEVICE — SUTURE VCRL + SZ 2-0 L18IN ABSRB UD CT1 L36MM 1/2 CIR VCP839D